# Patient Record
Sex: FEMALE | Race: WHITE | NOT HISPANIC OR LATINO | Employment: STUDENT | URBAN - METROPOLITAN AREA
[De-identification: names, ages, dates, MRNs, and addresses within clinical notes are randomized per-mention and may not be internally consistent; named-entity substitution may affect disease eponyms.]

---
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
## 2021-05-10 ENCOUNTER — IMMUNIZATIONS (OUTPATIENT)
Dept: FAMILY MEDICINE CLINIC | Facility: HOSPITAL | Age: 21
End: 2021-05-10

## 2021-05-10 DIAGNOSIS — Z23 ENCOUNTER FOR IMMUNIZATION: Primary | ICD-10-CM

## 2021-05-10 PROCEDURE — 91301 SARS-COV-2 / COVID-19 MRNA VACCINE (MODERNA) 100 MCG: CPT

## 2021-05-10 PROCEDURE — 0011A SARS-COV-2 / COVID-19 MRNA VACCINE (MODERNA) 100 MCG: CPT

## 2021-06-04 ENCOUNTER — OFFICE VISIT (OUTPATIENT)
Dept: FAMILY MEDICINE CLINIC | Facility: CLINIC | Age: 21
End: 2021-06-04
Payer: COMMERCIAL

## 2021-06-04 VITALS
HEIGHT: 64 IN | OXYGEN SATURATION: 99 % | HEART RATE: 77 BPM | SYSTOLIC BLOOD PRESSURE: 100 MMHG | TEMPERATURE: 98.2 F | DIASTOLIC BLOOD PRESSURE: 50 MMHG | BODY MASS INDEX: 27.96 KG/M2 | WEIGHT: 163.8 LBS

## 2021-06-04 DIAGNOSIS — Z11.1 SCREENING FOR TUBERCULOSIS: ICD-10-CM

## 2021-06-04 DIAGNOSIS — Z11.1 PPD SCREENING TEST: ICD-10-CM

## 2021-06-04 DIAGNOSIS — Z23 NEED FOR TDAP VACCINATION: ICD-10-CM

## 2021-06-04 DIAGNOSIS — Z01.84 IMMUNITY STATUS TESTING: ICD-10-CM

## 2021-06-04 DIAGNOSIS — Z00.00 ANNUAL PHYSICAL EXAM: Primary | ICD-10-CM

## 2021-06-04 PROCEDURE — 86580 TB INTRADERMAL TEST: CPT | Performed by: NURSE PRACTITIONER

## 2021-06-04 PROCEDURE — 1036F TOBACCO NON-USER: CPT | Performed by: NURSE PRACTITIONER

## 2021-06-04 PROCEDURE — 99395 PREV VISIT EST AGE 18-39: CPT | Performed by: NURSE PRACTITIONER

## 2021-06-04 PROCEDURE — 3725F SCREEN DEPRESSION PERFORMED: CPT | Performed by: NURSE PRACTITIONER

## 2021-06-04 PROCEDURE — 3008F BODY MASS INDEX DOCD: CPT | Performed by: NURSE PRACTITIONER

## 2021-06-04 RX ORDER — CLINDAMYCIN PHOSPHATE 10 UG/ML
LOTION TOPICAL
COMMUNITY
Start: 2020-12-16 | End: 2022-07-15 | Stop reason: ALTCHOICE

## 2021-06-04 RX ORDER — DOXYCYCLINE HYCLATE 100 MG
100 TABLET ORAL 2 TIMES DAILY WITH MEALS
COMMUNITY
Start: 2021-03-31 | End: 2022-07-15 | Stop reason: ALTCHOICE

## 2021-06-04 NOTE — PROGRESS NOTES
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  Orders placed  Will need Tdap  - schedule for >2 weeks  PPD placed today by MA  Read on Monday  Needs 2 step - 1 to 3 weeks after  Paperwork to be completed  In brown folder  recommend yearly physicals  Otherwise return as needed  Problem List Items Addressed This Visit     None      Visit Diagnoses     Annual physical exam    -  Primary    PPD screening test        Relevant Orders    TB Skin Test (Completed)    Need for Tdap vaccination        Screening for tuberculosis        Immunity status testing        Relevant Orders    Varicella zoster antibody, IgG    Mumps antibody, IgG    Rubella antibody, IgG    Rubeola antibody IgG    Hepatitis C Ab W/Refl To HCV RNA, Qn, PCR    Hepatitis B surface antibody          Immunizations and preventive care screenings were discussed with patient today  Appropriate education was printed on patient's after visit summary  Counseling:  Alcohol/drug use: discussed moderation in alcohol intake, the recommendations for healthy alcohol use, and avoidance of illicit drug use  Dental Health: discussed importance of regular tooth brushing, flossing, and dental visits  Injury prevention: discussed safety/seat belts, safety helmets, smoke detectors, carbon dioxide detectors, and smoking near bedding or upholstery    Sexual health: discussed sexually transmitted diseases, partner selection, use of condoms, avoidance of unintended pregnancy, and contraceptive alternatives  · Exercise: the importance of regular exercise/physical activity was discussed  Recommend exercise 3-5 times per week for at least 30 minutes  BMI Counseling: Body mass index is 28 31 kg/m²  The BMI is above normal  Nutrition recommendations include decreasing portion sizes, encouraging healthy choices of fruits and vegetables, decreasing fast food intake, moderation in carbohydrate intake, increasing intake of lean protein and reducing intake of saturated and trans fat  Exercise recommendations include moderate physical activity 150 minutes/week  Continue treadmill  Slowly increasing exercise  - pt with recent ACL tear/repair in December  Return in about 1 year (around 6/4/2022) for Annual physical      Chief Complaint:     Chief Complaint   Patient presents with   [address] [place] [address], nursing student, needs physical for school      History of Present Illness:     Adult Annual Physical   Patient here for a comprehensive physical exam  The patient reports no problems  Diet and Physical Activity  · Diet/Nutrition: well balanced diet and working on healthier diet  See BMI  · Exercise: walking  Tread mill  3-4 times weekly  Depression Screening  PHQ-9 Depression Screening    PHQ-9:   Frequency of the following problems over the past two weeks:      Little interest or pleasure in doing things: 0 - not at all  Feeling down, depressed, or hopeless: 0 - not at all  PHQ-2 Score: 0       General Health  · Sleep: sleeps well  · Hearing: normal - bilateral   · Vision: most recent eye exam >1 year ago and overdue  No formal emaination  in office snellen: 20/30: 20/30: 20/30  REcommend follow up with Ophthalmologist   For acuity check and routine general eye health      · Dental: regular dental visits         /GYN Health  · Last menstrual period: current  · Contraceptive method: nexplanon  Likely having removed  Random spotting  f/u with GYN  · History of STDs?: no  Denies need for STD testing today  Review of Systems:     Review of Systems   Constitutional: Negative  HENT: Negative  Respiratory: Negative  Cardiovascular: Negative  Gastrointestinal: Negative  Genitourinary: Negative  Musculoskeletal: Negative  Skin:        Acne - follows with Derm   Neurological: Negative  Psychiatric/Behavioral: Negative  Past Medical History:     History reviewed  No pertinent past medical history     Past Surgical History:     Past Surgical History:   Procedure Laterality Date    APPENDECTOMY      ARTHROSCOPIC REPAIR ACL        Social History:     E-Cigarette/Vaping    E-Cigarette Use Never User      E-Cigarette/Vaping Substances    Nicotine No     THC No     CBD No     Flavoring No     Other No     Unknown No      Social History     Socioeconomic History    Marital status: Single     Spouse name: None    Number of children: None    Years of education: None    Highest education level: None   Occupational History    None   Social Needs    Financial resource strain: None    Food insecurity     Worry: None     Inability: None    Transportation needs     Medical: None     Non-medical: None   Tobacco Use    Smoking status: Never Smoker    Smokeless tobacco: Never Used   Substance and Sexual Activity    Alcohol use: Never     Frequency: Never    Drug use: Never    Sexual activity: Not Currently   Lifestyle    Physical activity     Days per week: None     Minutes per session: None    Stress: None   Relationships    Social connections     Talks on phone: None     Gets together: None     Attends Temple service: None     Active member of club or organization: None     Attends meetings of clubs or organizations: None     Relationship status: None    Intimate partner violence     Fear of current or ex partner: None Emotionally abused: None     Physically abused: None     Forced sexual activity: None   Other Topics Concern    None   Social History Narrative    None      Family History:     Family History   Problem Relation Age of Onset    Hypertension Father     Breast cancer Maternal Grandmother     Prostate cancer Maternal Grandfather       Current Medications:     Current Outpatient Medications   Medication Sig Dispense Refill    benzoyl peroxide 5 % external liquid       clindamycin (CLEOCIN T) 1 % lotion       tretinoin (RETIN-A) 0 025 % cream       doxycycline hyclate (VIBRA-TABS) 100 mg tablet Take 100 mg by mouth 2 (two) times a day with meals      etonogestrel (Nexplanon) subdermal implant 68 mg by Subdermal route once       No current facility-administered medications for this visit  Allergies:     No Known Allergies   Physical Exam:     /50 (BP Location: Right arm, Patient Position: Sitting, Cuff Size: Adult)   Pulse 77   Temp 98 2 °F (36 8 °C)   Ht 5' 3 78" (1 62 m)   Wt 74 3 kg (163 lb 12 8 oz)   SpO2 99%   BMI 28 31 kg/m²     Physical Exam  Vitals signs and nursing note reviewed  Constitutional:       General: She is not in acute distress  Appearance: Normal appearance  She is well-developed, well-groomed and normal weight  She is not ill-appearing  HENT:      Head: Normocephalic and atraumatic  Right Ear: Hearing, tympanic membrane, ear canal and external ear normal       Left Ear: Hearing, tympanic membrane, ear canal and external ear normal       Nose: Nose normal       Mouth/Throat:      Mouth: Mucous membranes are moist       Pharynx: Oropharynx is clear  Tonsils: No tonsillar exudate  2+ on the right  2+ on the left  Eyes:      Extraocular Movements: Extraocular movements intact  Conjunctiva/sclera: Conjunctivae normal       Pupils: Pupils are equal, round, and reactive to light  Neck:      Thyroid: No thyromegaly  Vascular: No carotid bruit  Cardiovascular:      Rate and Rhythm: Normal rate and regular rhythm  No extrasystoles are present  Pulses:           Posterior tibial pulses are 2+ on the right side and 2+ on the left side  Heart sounds: Normal heart sounds  Pulmonary:      Effort: Pulmonary effort is normal  No respiratory distress  Breath sounds: Normal breath sounds and air entry  Abdominal:      General: Abdomen is flat  Bowel sounds are normal       Palpations: Abdomen is soft  Tenderness: There is no abdominal tenderness  Comments:  Umbilical piercing   Musculoskeletal:      Right lower leg: No edema  Left lower leg: No edema  Lymphadenopathy:      Cervical: No cervical adenopathy  Skin:     General: Skin is warm and dry  Neurological:      General: No focal deficit present  Mental Status: She is alert and oriented to person, place, and time  Cranial Nerves: No cranial nerve deficit  Sensory: No sensory deficit  Motor: No weakness  Coordination: Coordination normal       Gait: Gait normal    Psychiatric:         Attention and Perception: Attention normal          Mood and Affect: Mood and affect normal          Speech: Speech normal          Behavior: Behavior normal          Thought Content:  Thought content normal           [facility]   [facility]

## 2021-06-04 NOTE — PATIENT INSTRUCTIONS

## 2021-06-05 ENCOUNTER — IMMUNIZATIONS (OUTPATIENT)
Dept: FAMILY MEDICINE CLINIC | Facility: HOSPITAL | Age: 21
End: 2021-06-05

## 2021-06-05 DIAGNOSIS — Z23 ENCOUNTER FOR IMMUNIZATION: Primary | ICD-10-CM

## 2021-06-05 PROCEDURE — 91301 SARS-COV-2 / COVID-19 MRNA VACCINE (MODERNA) 100 MCG: CPT

## 2021-06-05 PROCEDURE — 0012A SARS-COV-2 / COVID-19 MRNA VACCINE (MODERNA) 100 MCG: CPT

## 2021-06-07 ENCOUNTER — APPOINTMENT (OUTPATIENT)
Dept: LAB | Facility: CLINIC | Age: 21
End: 2021-06-07

## 2021-06-07 ENCOUNTER — CLINICAL SUPPORT (OUTPATIENT)
Dept: FAMILY MEDICINE CLINIC | Facility: CLINIC | Age: 21
End: 2021-06-07

## 2021-06-07 DIAGNOSIS — Z11.1 PPD SCREENING TEST: Primary | ICD-10-CM

## 2021-06-07 DIAGNOSIS — Z01.84 IMMUNITY STATUS TESTING: Primary | ICD-10-CM

## 2021-06-07 LAB
INDURATION: 0 MM
RUBV IGG SERPL IA-ACNC: 100.1 IU/ML
TB SKIN TEST: NEGATIVE

## 2021-06-07 PROCEDURE — 36415 COLL VENOUS BLD VENIPUNCTURE: CPT

## 2021-06-07 PROCEDURE — 86762 RUBELLA ANTIBODY: CPT

## 2021-06-07 PROCEDURE — 86735 MUMPS ANTIBODY: CPT

## 2021-06-07 PROCEDURE — 86706 HEP B SURFACE ANTIBODY: CPT

## 2021-06-07 PROCEDURE — 86765 RUBEOLA ANTIBODY: CPT

## 2021-06-07 PROCEDURE — 86803 HEPATITIS C AB TEST: CPT

## 2021-06-07 PROCEDURE — 86787 VARICELLA-ZOSTER ANTIBODY: CPT

## 2021-06-08 LAB
HBV SURFACE AB SER-ACNC: 5.04 MIU/ML
HCV AB SER QL: NORMAL
MEV IGG SER QL: NORMAL
MUV IGG SER QL: NORMAL
VZV IGG SER IA-ACNC: NORMAL

## 2021-06-21 ENCOUNTER — CLINICAL SUPPORT (OUTPATIENT)
Dept: FAMILY MEDICINE CLINIC | Facility: CLINIC | Age: 21
End: 2021-06-21
Payer: COMMERCIAL

## 2021-06-21 DIAGNOSIS — Z11.1 SCREENING FOR TUBERCULOSIS: ICD-10-CM

## 2021-06-21 DIAGNOSIS — Z23 NEED FOR TETANUS, DIPHTHERIA, AND ACELLULAR PERTUSSIS (TDAP) VACCINE: Primary | ICD-10-CM

## 2021-06-21 PROCEDURE — 90715 TDAP VACCINE 7 YRS/> IM: CPT | Performed by: NURSE PRACTITIONER

## 2021-06-21 PROCEDURE — 86580 TB INTRADERMAL TEST: CPT | Performed by: NURSE PRACTITIONER

## 2021-06-21 PROCEDURE — 90471 IMMUNIZATION ADMIN: CPT | Performed by: NURSE PRACTITIONER

## 2021-06-23 ENCOUNTER — CLINICAL SUPPORT (OUTPATIENT)
Dept: FAMILY MEDICINE CLINIC | Facility: CLINIC | Age: 21
End: 2021-06-23

## 2021-06-23 DIAGNOSIS — Z11.1 SCREENING FOR TUBERCULOSIS: Primary | ICD-10-CM

## 2021-06-23 LAB
INDURATION: 0 MM
TB SKIN TEST: NEGATIVE

## 2021-07-13 ENCOUNTER — CLINICAL SUPPORT (OUTPATIENT)
Dept: FAMILY MEDICINE CLINIC | Facility: CLINIC | Age: 21
End: 2021-07-13
Payer: COMMERCIAL

## 2021-07-13 ENCOUNTER — TELEPHONE (OUTPATIENT)
Dept: FAMILY MEDICINE CLINIC | Facility: CLINIC | Age: 21
End: 2021-07-13

## 2021-07-13 VITALS — TEMPERATURE: 98.2 F

## 2021-07-13 DIAGNOSIS — Z23 NEED FOR HEPATITIS B VACCINATION: Primary | ICD-10-CM

## 2021-07-13 PROCEDURE — 90746 HEPB VACCINE 3 DOSE ADULT IM: CPT | Performed by: NURSE PRACTITIONER

## 2021-07-13 PROCEDURE — 90471 IMMUNIZATION ADMIN: CPT | Performed by: NURSE PRACTITIONER

## 2021-07-13 NOTE — TELEPHONE ENCOUNTER
----- Message from Gumaro Shaver DO sent at 7/13/2021  9:31 AM EDT -----  Regarding: heb B booster  Precious Ruth does not show immunity to Hepatitis B despite having 3 dose series in childhood  She will need to complete another 3 dose series per recommendation of her program   First dose given today, then 2nd dose in 4-8 weeks, 3rd dose in 6 months  If she still does not show immunity after her series is completed, I do not recommend she receive booster again  In that case she should be made aware that she is unable to mount immune response to Heb B and is at greater risk of infection if she exposed to hepatitis B

## 2021-07-13 NOTE — TELEPHONE ENCOUNTER
Left detailed message for mother to inform Idalia Collado of when to complete 2 and 3rd doses for Hep B  If she gets them done at school, to let us know for documentation purposes

## 2021-08-12 ENCOUNTER — CLINICAL SUPPORT (OUTPATIENT)
Dept: FAMILY MEDICINE CLINIC | Facility: CLINIC | Age: 21
End: 2021-08-12
Payer: COMMERCIAL

## 2021-08-12 VITALS — TEMPERATURE: 97.5 F

## 2021-08-12 DIAGNOSIS — Z23 NEED FOR HEPATITIS B VACCINATION: Primary | ICD-10-CM

## 2021-08-12 PROCEDURE — 90746 HEPB VACCINE 3 DOSE ADULT IM: CPT | Performed by: NURSE PRACTITIONER

## 2021-08-12 PROCEDURE — 90471 IMMUNIZATION ADMIN: CPT | Performed by: NURSE PRACTITIONER

## 2022-01-10 ENCOUNTER — CLINICAL SUPPORT (OUTPATIENT)
Dept: FAMILY MEDICINE CLINIC | Facility: CLINIC | Age: 22
End: 2022-01-10
Payer: COMMERCIAL

## 2022-01-10 DIAGNOSIS — Z23 NEED FOR VACCINATION: Primary | ICD-10-CM

## 2022-01-10 PROCEDURE — 90471 IMMUNIZATION ADMIN: CPT | Performed by: NURSE PRACTITIONER

## 2022-01-10 PROCEDURE — 90746 HEPB VACCINE 3 DOSE ADULT IM: CPT | Performed by: NURSE PRACTITIONER

## 2022-03-11 ENCOUNTER — OFFICE VISIT (OUTPATIENT)
Dept: FAMILY MEDICINE CLINIC | Facility: CLINIC | Age: 22
End: 2022-03-11
Payer: COMMERCIAL

## 2022-03-11 VITALS
BODY MASS INDEX: 23.56 KG/M2 | SYSTOLIC BLOOD PRESSURE: 100 MMHG | OXYGEN SATURATION: 99 % | DIASTOLIC BLOOD PRESSURE: 60 MMHG | RESPIRATION RATE: 16 BRPM | WEIGHT: 138 LBS | HEIGHT: 64 IN | HEART RATE: 97 BPM | TEMPERATURE: 97.7 F

## 2022-03-11 DIAGNOSIS — B27.90 ACUTE PHARYNGITIS DUE TO INFECTIOUS MONONUCLEOSIS: ICD-10-CM

## 2022-03-11 DIAGNOSIS — J03.90 TONSILLITIS: Primary | ICD-10-CM

## 2022-03-11 PROCEDURE — 1036F TOBACCO NON-USER: CPT | Performed by: FAMILY MEDICINE

## 2022-03-11 PROCEDURE — 99213 OFFICE O/P EST LOW 20 MIN: CPT | Performed by: FAMILY MEDICINE

## 2022-03-11 PROCEDURE — 3008F BODY MASS INDEX DOCD: CPT | Performed by: FAMILY MEDICINE

## 2022-03-11 RX ORDER — NORETHINDRONE ACETATE AND ETHINYL ESTRADIOL AND FERROUS FUMARATE 1MG-20(21)
1 KIT ORAL DAILY
COMMUNITY
Start: 2022-02-17

## 2022-03-11 RX ORDER — PREDNISONE 10 MG/1
TABLET ORAL
Qty: 30 TABLET | Refills: 1 | Status: SHIPPED | OUTPATIENT
Start: 2022-03-11 | End: 2022-05-25 | Stop reason: SDUPTHER

## 2022-03-13 NOTE — PROGRESS NOTES
[facility]      NAME: Aneesh Carias  AGE: 22 y o  SEX: female  : 2000   MRN: [id number]    DATE: 3/13/2022  TIME: 2:37 PM    Assessment and Plan     Problem List Items Addressed This Visit     None      Visit Diagnoses     Tonsillitis    -  Primary    Acute pharyngitis due to infectious mononucleosis        Relevant Medications    predniSONE 10 mg tablet              Return to office in:  P r n  Chief Complaint     Chief Complaint   Patient presents with    Sore Throat       History of Present Illness     Patient was seen for chief complaint of sore throat  Was seen at [facility] proximally 1 month ago at which time she was placed on antibiotics  Symptoms improved temporarily  Seen again at [facility] several days ago at which time rapid strep negative rapid mono test positive  The following portions of the patient's history were reviewed and updated as appropriate: allergies, current medications, past family history, past medical history, past social history, past surgical history and problem list     Review of Systems   Review of Systems   Constitutional: Negative  HENT: Positive for sore throat and trouble swallowing  Respiratory: Negative  Cardiovascular: Negative  Gastrointestinal: Negative  Genitourinary: Negative  Musculoskeletal: Negative  Psychiatric/Behavioral: Negative  Active Problem List   There is no problem list on file for this patient  Objective   /60 (BP Location: Left arm, Patient Position: Sitting, Cuff Size: Adult)   Pulse 97   Temp 97 7 °F (36 5 °C) (Temporal)   Resp 16   Ht 5' 3 78" (1 62 m)   Wt 62 6 kg (138 lb)   SpO2 99%   BMI 23 85 kg/m²     Physical Exam  Vitals and nursing note reviewed  Constitutional:       General: She is not in acute distress  Appearance: She is well-developed  She is not diaphoretic  HENT:      Head: Normocephalic and atraumatic        Mouth/Throat:      Pharynx: Pharyngeal swelling, oropharyngeal exudate and posterior oropharyngeal erythema present  Comments: Injected pharynx with enlarged tonsils and exudate  Eyes:      General:         Right eye: No discharge  Conjunctiva/sclera: Conjunctivae normal       Pupils: Pupils are equal, round, and reactive to light  Neck:      Thyroid: No thyromegaly  Cardiovascular:      Rate and Rhythm: Normal rate and regular rhythm  Pulmonary:      Effort: Pulmonary effort is normal  No respiratory distress  Breath sounds: Normal breath sounds  Musculoskeletal:      Cervical back: Normal range of motion  Lymphadenopathy:      Cervical: No cervical adenopathy  Skin:     General: Skin is warm and dry  Neurological:      Mental Status: She is alert and oriented to person, place, and time  Psychiatric:         Behavior: Behavior normal          Thought Content:  Thought content normal          Judgment: Judgment normal            Current Medications     Current Outpatient Medications:     Junel FE 1/20 1-20 MG-MCG per tablet, Take 1 tablet by mouth daily, Disp: , Rfl:     benzoyl peroxide 5 % external liquid, , Disp: , Rfl:     clindamycin (CLEOCIN T) 1 % lotion, , Disp: , Rfl:     doxycycline hyclate (VIBRA-TABS) 100 mg tablet, Take 100 mg by mouth 2 (two) times a day with meals (Patient not taking: Reported on 3/11/2022 ), Disp: , Rfl:     etonogestrel (Nexplanon) subdermal implant, 68 mg by Subdermal route once (Patient not taking: Reported on 3/11/2022 ), Disp: , Rfl:     predniSONE 10 mg tablet, Five p o  for 2 days Four p o  for 2 Three p o  for 2 days Two p o  for 2 days One p o  for 2 days, Disp: 30 tablet, Rfl: 1    tretinoin (RETIN-A) 0 025 % cream, , Disp: , Rfl:     Health Maintenance     Health Maintenance   Topic Date Due    HIV Screening  Never done    Chlamydia Screening  Never done    Influenza Vaccine (1) 09/01/2021    Depression Screening  06/04/2022    Annual Physical  06/04/2022    BMI: Adult 03/11/2023    DTaP,Tdap,and Td Vaccines (7 - Td or Tdap) 06/21/2031    Hepatitis C Screening  Completed    HIB Vaccine  Completed    Hepatitis B Vaccine  Completed    IPV Vaccine  Completed    Hepatitis A Vaccine  Completed    Meningococcal ACWY Vaccine  Completed    HPV Vaccine  Completed    COVID-19 Vaccine  Completed    Pneumococcal Vaccine: Pediatrics (0 to 5 Years) and At-Risk Patients (6 to 59 Years)  Aged [employer] History   Administered Date(s) Administered    COVID-19 MODERNA VACC 0 5 ML IM 05/10/2021, 06/05/2021, 12/07/2021    DTaP,unspecified 02/14/2002, 04/25/2002, 06/27/2002, 01/14/2004, 05/15/2007    HPV 01/09/2014, 03/13/2014, 07/16/2014    Hep A, ped/adol, 2 dose 08/22/2012, 01/09/2014    Hep B, Adolescent or Pediatric 2001, 02/14/2002, 09/16/2002    Hep B, adult 07/13/2021, 08/12/2021, 01/10/2022    HiB 02/14/2002, 04/25/2002, 06/27/2002, 04/01/2003    INFLUENZA 10/24/2002, 11/26/2002, 10/04/2004, 04/11/2007, 08/13/2009, 08/20/2010, 02/03/2012, 08/22/2012, 01/09/2014, 02/13/2015, 09/18/2015, 03/03/2017, 12/22/2017, 01/07/2019    IPV 02/14/2002, 04/25/2002, 01/14/2004, 05/15/2007    MMR 12/17/2002, 04/20/2006    Meningococcal B, OMV (BEXSERO) 04/02/2019, 05/23/2019    Meningococcal Conjugate (MCV4O) 02/13/2015, 12/22/2017    Pneumococcal Conjugate PCV 7 04/25/2002, 06/27/2002, 11/26/2002, 09/03/2004    Td (adult), Unspecified 02/13/2015    Tdap 06/21/2021    Tuberculin Skin Test-PPD Intradermal 06/06/2019, 06/16/2019, 06/04/2021, 06/21/2021    Varicella 12/17/2002, 05/15/2007       Ani Mathias DO  [facility]

## 2022-05-25 ENCOUNTER — APPOINTMENT (OUTPATIENT)
Dept: RADIOLOGY | Facility: CLINIC | Age: 22
End: 2022-05-25
Payer: COMMERCIAL

## 2022-05-25 ENCOUNTER — OFFICE VISIT (OUTPATIENT)
Dept: FAMILY MEDICINE CLINIC | Facility: CLINIC | Age: 22
End: 2022-05-25
Payer: COMMERCIAL

## 2022-05-25 VITALS
BODY MASS INDEX: 24.99 KG/M2 | OXYGEN SATURATION: 99 % | WEIGHT: 150 LBS | HEART RATE: 81 BPM | DIASTOLIC BLOOD PRESSURE: 82 MMHG | HEIGHT: 65 IN | SYSTOLIC BLOOD PRESSURE: 110 MMHG | TEMPERATURE: 98.4 F

## 2022-05-25 DIAGNOSIS — J98.8 RESPIRATORY INFECTION: Primary | ICD-10-CM

## 2022-05-25 DIAGNOSIS — J98.8 RESPIRATORY INFECTION: ICD-10-CM

## 2022-05-25 PROCEDURE — 3008F BODY MASS INDEX DOCD: CPT | Performed by: NURSE PRACTITIONER

## 2022-05-25 PROCEDURE — 1036F TOBACCO NON-USER: CPT | Performed by: NURSE PRACTITIONER

## 2022-05-25 PROCEDURE — 99213 OFFICE O/P EST LOW 20 MIN: CPT | Performed by: NURSE PRACTITIONER

## 2022-05-25 PROCEDURE — 3725F SCREEN DEPRESSION PERFORMED: CPT | Performed by: NURSE PRACTITIONER

## 2022-05-25 PROCEDURE — 71046 X-RAY EXAM CHEST 2 VIEWS: CPT

## 2022-05-25 RX ORDER — PREDNISONE 10 MG/1
TABLET ORAL
Qty: 30 TABLET | Refills: 0 | Status: SHIPPED | OUTPATIENT
Start: 2022-05-25 | End: 2022-07-15 | Stop reason: ALTCHOICE

## 2022-05-25 RX ORDER — AZITHROMYCIN 250 MG/1
TABLET, FILM COATED ORAL
Qty: 6 TABLET | Refills: 0 | Status: SHIPPED | OUTPATIENT
Start: 2022-05-25 | End: 2022-05-30

## 2022-05-25 RX ORDER — ALBUTEROL SULFATE 90 UG/1
2 AEROSOL, METERED RESPIRATORY (INHALATION) EVERY 6 HOURS PRN
Qty: 6.7 G | Refills: 0 | Status: SHIPPED | OUTPATIENT
Start: 2022-05-25 | End: 2022-06-21

## 2022-05-25 NOTE — PROGRESS NOTES
[facility]ICAL [facility]    ASSESSMENT AND PLAN     1  Respiratory infection  Assessment & Plan:  Treat today with Z-jp and prednisone taper  Rx for Albuterol - educated on proper use  May continue OTC Mucinex  Tylenol/advil prn  Sinus rinses, warm salt water gargles  Symptoms >3 weeks - will check chest xray today - very wheezing with no asthmatic history  Advised result will flow to [facility]  We will call if alternate plan is indicated  Advised to f/u in office if symptoms persist, change or worsen  Pt agreeable with plan    Orders:  -     azithromycin (Zithromax) 250 mg tablet; Take 2 tablets (500 mg total) by mouth daily for 1 day, THEN 1 tablet (250 mg total) daily for 4 days  -     predniSONE 10 mg tablet; Five p o  for 2 days Four p o  for 2 Three p o  for 2 days Two p o  for 2 days One p o  for 2 days  -     albuterol (Proventil HFA) 90 mcg/act inhaler; Inhale 2 puffs every 6 (six) hours as needed for wheezing or shortness of breath  -     XR chest pa & lateral; Future; Expected date: 05/25/2022         SUBJECTIVE       Patient ID: Sarah Urias is a 22 y o  female  Chief Complaint   Patient presents with    Cold Like Symptoms     Cough, congestion, exepelling yellow mucus- x3 weeks       HISTORY OF PRESENT ILLNESS    Presents with respiratory symptoms  Started 3 weeks ago just 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  Note she was also recently treated for Mono in March  Symptoms today: sinus pressure, nasal and chest congestion, productive cough - yellow mucus  No fever  No GI  OTC Mucinex spray  Home Covid (-)  She is vax and boosted    Cough  This is a new problem  The current episode started 1 to 4 weeks ago  The problem has been unchanged  The problem occurs every few minutes  The cough is productive of sputum  Associated symptoms include chest pain, headaches, nasal congestion, postnasal drip, rhinorrhea and wheezing   Pertinent negatives include no chills, ear congestion, ear pain, fever, heartburn, hemoptysis, myalgias, rash, sore throat, shortness of breath, sweats or weight loss  The symptoms are aggravated by exercise  Risk factors for lung disease include smoking/tobacco exposure  The following portions of the patient's history were reviewed and updated as appropriate: allergies, current medications, past family history, past medical history, past social history, past surgical history, and problem list     REVIEW OF SYSTEMS  Review of Systems   Constitutional: Positive for fatigue  Negative for activity change, appetite change, chills, fever and weight loss  HENT: Positive for congestion, postnasal drip and rhinorrhea  Negative for ear pain and sore throat  Respiratory: Positive for cough, chest tightness and wheezing  Negative for hemoptysis and shortness of breath  Cardiovascular: Positive for chest pain  Gastrointestinal: Negative  Negative for heartburn  Musculoskeletal: Negative for myalgias  Skin: Negative for rash  Neurological: Positive for headaches  Psychiatric/Behavioral: Negative  OBJECTIVE      VITAL SIGNS  /82 (BP Location: Left arm, Patient Position: Sitting, Cuff Size: Standard)   Pulse 81   Temp 98 4 °F (36 9 °C) (Temporal)   Ht 5' 5" (1 651 m)   Wt 68 kg (150 lb)   LMP 05/25/2022   SpO2 99%   BMI 24 96 kg/m²     CURRENT MEDICATIONS    Current Outpatient Medications:     albuterol (Proventil HFA) 90 mcg/act inhaler, Inhale 2 puffs every 6 (six) hours as needed for wheezing or shortness of breath, Disp: 6 7 g, Rfl: 0    azithromycin (Zithromax) 250 mg tablet, Take 2 tablets (500 mg total) by mouth daily for 1 day, THEN 1 tablet (250 mg total) daily for 4 days  , Disp: 6 tablet, Rfl: 0    Junel FE 1/20 1-20 MG-MCG per tablet, Take 1 tablet by mouth daily, Disp: , Rfl:     predniSONE 10 mg tablet, Five p o  for 2 days Four p o  for 2 Three p o  for 2 days Two p o  for 2 days One p o  for 2 days, Disp: 30 tablet, Rfl: 0    tretinoin (RETIN-A) 0 025 % cream, , Disp: , Rfl:     benzoyl peroxide 5 % external liquid, , Disp: , Rfl:     clindamycin (CLEOCIN T) 1 % lotion, , Disp: , Rfl:     doxycycline hyclate (VIBRA-TABS) 100 mg tablet, Take 100 mg by mouth 2 (two) times a day with meals (Patient not taking: No sig reported), Disp: , Rfl:     etonogestrel (NEXPLANON) subdermal implant, 68 mg by Subdermal route once (Patient not taking: No sig reported), Disp: , Rfl:       PHYSICAL EXAMINATION   Physical Exam  Vitals and nursing note reviewed  Constitutional:       General: She is not in acute distress  Appearance: Normal appearance  She is well-developed and well-groomed  She is not ill-appearing  HENT:      Head: Normocephalic and atraumatic  Right Ear: Tympanic membrane, ear canal and external ear normal       Left Ear: Tympanic membrane, ear canal and external ear normal       Nose: Mucosal edema, congestion and rhinorrhea present  Mouth/Throat:      Mouth: Mucous membranes are moist       Pharynx: No posterior oropharyngeal erythema  Tonsils: No tonsillar exudate  3+ on the right  3+ on the left  Eyes:      Pupils: Pupils are equal, round, and reactive to light  Neck:      Vascular: No carotid bruit  Cardiovascular:      Rate and Rhythm: Normal rate and regular rhythm  Heart sounds: Normal heart sounds  Pulmonary:      Effort: Pulmonary effort is normal  No respiratory distress  Breath sounds: Normal air entry  Wheezing (through out) present  No rhonchi or rales  Lymphadenopathy:      Cervical: No cervical adenopathy  Skin:     General: Skin is warm and dry  Neurological:      General: No focal deficit present  Mental Status: She is alert and oriented to person, place, and time  Psychiatric:         Attention and Perception: Attention normal          Mood and Affect: Mood normal          Speech: Speech normal          Behavior: Behavior normal          Thought Content:  Thought content normal          Cognition and Memory: Cognition normal          Judgment: Judgment normal

## 2022-05-25 NOTE — ASSESSMENT & PLAN NOTE
Treat today with Z-jp and prednisone taper  Rx for Albuterol - educated on proper use  May continue OTC Mucinex  Tylenol/advil prn  Sinus rinses, warm salt water gargles  Symptoms >3 weeks - will check chest xray today - very wheezing with no asthmatic history  Advised result will flow to [facility]  We will call if alternate plan is indicated  Advised to f/u in office if symptoms persist, change or worsen   Pt agreeable with plan

## 2022-06-13 ENCOUNTER — OFFICE VISIT (OUTPATIENT)
Dept: FAMILY MEDICINE CLINIC | Facility: CLINIC | Age: 22
End: 2022-06-13
Payer: COMMERCIAL

## 2022-06-13 VITALS
HEART RATE: 92 BPM | OXYGEN SATURATION: 98 % | BODY MASS INDEX: 25.06 KG/M2 | HEIGHT: 65 IN | WEIGHT: 150.4 LBS | SYSTOLIC BLOOD PRESSURE: 116 MMHG | TEMPERATURE: 98.9 F | DIASTOLIC BLOOD PRESSURE: 70 MMHG

## 2022-06-13 DIAGNOSIS — Z13.1 SCREENING FOR DIABETES MELLITUS: ICD-10-CM

## 2022-06-13 DIAGNOSIS — R00.2 PALPITATIONS: ICD-10-CM

## 2022-06-13 DIAGNOSIS — Z13.0 SCREENING, ANEMIA, DEFICIENCY, IRON: ICD-10-CM

## 2022-06-13 DIAGNOSIS — Z13.29 SCREENING FOR THYROID DISORDER: ICD-10-CM

## 2022-06-13 DIAGNOSIS — Z13.220 SCREENING, LIPID: ICD-10-CM

## 2022-06-13 DIAGNOSIS — Z00.00 ANNUAL PHYSICAL EXAM: Primary | ICD-10-CM

## 2022-06-13 DIAGNOSIS — Z11.1 SCREENING FOR TUBERCULOSIS: ICD-10-CM

## 2022-06-13 DIAGNOSIS — Z11.4 SCREENING FOR HIV (HUMAN IMMUNODEFICIENCY VIRUS): ICD-10-CM

## 2022-06-13 DIAGNOSIS — Z13.21 ENCOUNTER FOR VITAMIN DEFICIENCY SCREENING: ICD-10-CM

## 2022-06-13 DIAGNOSIS — R42 DIZZINESS: ICD-10-CM

## 2022-06-13 DIAGNOSIS — Z11.59 NEED FOR HEPATITIS C SCREENING TEST: ICD-10-CM

## 2022-06-13 DIAGNOSIS — Z11.3 SCREENING FOR STD (SEXUALLY TRANSMITTED DISEASE): ICD-10-CM

## 2022-06-13 PROCEDURE — 3008F BODY MASS INDEX DOCD: CPT | Performed by: NURSE PRACTITIONER

## 2022-06-13 PROCEDURE — 86580 TB INTRADERMAL TEST: CPT | Performed by: NURSE PRACTITIONER

## 2022-06-13 PROCEDURE — 1036F TOBACCO NON-USER: CPT | Performed by: NURSE PRACTITIONER

## 2022-06-13 PROCEDURE — 99395 PREV VISIT EST AGE 18-39: CPT | Performed by: NURSE PRACTITIONER

## 2022-06-13 NOTE — PROGRESS NOTES
ADULT ANNUAL [address] [place] [address] MEDICAL GROUP    NAME: Lucy Duarte  AGE: 22 y o  SEX: female  : 2000     DATE: 2022     Assessment and Plan:   Comprehensive physical exam    Discussed palpitations  Seem to occur most at HS when laying down to sleep  Discussed differentials  Anxiety, sleep depravation, Gerd  Will get lab work as below  Advised she keep activity/diet diary of when symptoms occur  Discussed possible Holter  She will f/u in next 2 -3 weeks  Sooner with concerns  PMH reviewed  Preventative care and recommended screenings reviewed/ordered    Problem List Items Addressed This Visit        Other    Palpitations     Discussed recent palpitations/dizziness             Relevant Orders    CBC and differential    Comprehensive metabolic panel    TSH, 3rd generation with Free T4 reflex    Iron, TIBC and Ferritin Panel      Other Visit Diagnoses     Annual physical exam    -  Primary    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 [employer]    Screening for diabetes mellitus        Relevant Orders    Comprehensive metabolic panel    Encounter for vitamin deficiency screening        Relevant Orders    Vitamin D 25 hydroxy    Vitamin B12    Screening, lipid        Screening for thyroid disorder        Relevant Orders    TSH, 3rd generation with Free T4 reflex    Dizziness        Relevant Orders    Comprehensive metabolic panel    TSH, 3rd generation with Free T4 reflex    Iron, TIBC and Ferritin Panel    Screening, anemia, deficiency, iron        Relevant Orders    CBC and differential    Iron, TIBC and Ferritin Panel    Screening for STD (sexually transmitted disease)        Relevant Orders    Chlamydia/GC amplified DNA by PCR    RPR    Herpes I/II IgG PAWAN w Reflex to HSV-2    Human Immunodeficiency Virus 1/2 Antigen / Antibody ( Fourth Generation) with Reflex Testing    Hepatitis C Ab W/Refl To HCV RNA, Qn, PCR    Screening for HIV (human immunodeficiency virus)        Relevant Orders    Human Immunodeficiency Virus 1/2 Antigen / Antibody ( Fourth Generation) with Reflex Testing    Need for hepatitis C screening test        Relevant Orders    Hepatitis C Ab W/Refl To HCV RNA, Qn, PCR    Screening for tuberculosis        Required for clinical at nursing school  Administered today by LPN  Patient advised to follow-up Wednesday afternoon/Thursday morning for read    Relevant Orders    TB Skin Test (Completed)          Immunizations and preventive care screenings were discussed with patient today  Appropriate education was printed on patient's after visit summary  Counseling:  Alcohol/drug use: discussed moderation in alcohol intake, the recommendations for healthy alcohol use, and avoidance of illicit drug use  Dental Health: discussed importance of regular tooth brushing, flossing, and dental visits  Injury prevention: discussed safety/seat belts, safety helmets, smoke detectors, carbon dioxide detectors, and smoking near bedding or upholstery  Sexual health: discussed sexually transmitted diseases, partner selection, use of condoms, avoidance of unintended pregnancy, and contraceptive alternatives  · Exercise: the importance of regular exercise/physical activity was discussed  Recommend exercise 3-5 times per week for at least 30 minutes  BMI Counseling: Body mass index is 24 8 kg/m²  The BMI is above normal  Nutrition recommendations include increasing intake of lean protein and reducing intake of saturated and trans fat  Exercise recommendations include moderate physical activity 150 minutes/week  Rationale for BMI follow-up plan is due to patient being overweight or obese  Healthy lifestsyle discussed    Depression Screening and Follow-up Plan: Clincally patient does not have depression  No treatment is required  Does appear to have some periodic anxiety           Return in about 4 weeks (around 7/11/2022) for Recheck w/ me  Chief Complaint:     Chief Complaint   Patient presents with    Physical Exam     Nursing school      History of Present Illness:     Adult Annual Physical   Patient here for a comprehensive physical exam  The patient reports problems - heart palpitations - random  Cannot identify triggers  Occur randomly, but most when laying down  No chest pain       Diet and Physical Activity  · Diet/Nutrition: well balanced diet  · Exercise: moderate cardiovascular exercise  Depression Screening  PHQ-2/9 Depression Screening    Little interest or pleasure in doing things: 0 - not at all  Feeling down, depressed, or hopeless: 0 - not at all       [facility]  · Sleep: sleeps well  · Hearing: normal - bilateral   · Vision: no vision problems, most recent eye exam >1 year ago and Snellen: 20/40  Dsicussed importance of regular eye/vision check ups  · Dental: regular dental visits  /GYN Health  · Last menstrual period: 5/25  · Contraceptive method: oral contraceptives  · History of STDs?: no  Testing offered today - Not currently sexually active, but pt would like to complete  Aware of importance of condom use  · Will be due for first Women's health exam 2023     Review of Systems:     Review of Systems   Constitutional: Negative  HENT: Negative  Eyes: Negative  Respiratory: Negative  Cardiovascular: Positive for palpitations (most notable when laying down at night)  Negative for chest pain and leg swelling  Gastrointestinal: Negative  Genitourinary: Negative  Musculoskeletal: Negative  Skin: Negative  Neurological: Positive for dizziness (occasional episodes - cannot pinpoint when they occur)  Psychiatric/Behavioral: Negative  Past Medical History:     History reviewed  No pertinent past medical history     Past Surgical History:     Past Surgical History:   Procedure Laterality Date    APPENDECTOMY      ARTHROSCOPIC REPAIR ACL        Social History:     Social History     Socioeconomic History    Marital status: Single     Spouse name: None    Number of children: None    Years of education: None    Highest education level: None   Occupational History    None   Tobacco Use    Smoking status: Never Smoker    Smokeless tobacco: Never Used   Vaping Use    Vaping Use: Some days    Substances: Nicotine, Flavoring   Substance and Sexual Activity    Alcohol use: Yes     Comment: socially    Drug use: Never    Sexual activity: Not Currently   Other Topics Concern    None   Social History Narrative    None     Social Determinants of Health     Financial Resource Strain: Not on file   Food Insecurity: Not on file   Transportation Needs: Not on file   Physical Activity: Not on file   Stress: Not on file   Social Connections: Not on file   Intimate Partner Violence: Not on file   Housing Stability: Not on file      Family History:     Family History   Problem Relation Age of Onset    Hypertension Father     Breast cancer Maternal Grandmother     Prostate cancer Maternal Grandfather       Current Medications:     Current Outpatient Medications   Medication Sig Dispense Refill    albuterol (Proventil HFA) 90 mcg/act inhaler Inhale 2 puffs every 6 (six) hours as needed for wheezing or shortness of breath 6 7 g 0    Junel FE 1/20 1-20 MG-MCG per tablet Take 1 tablet by mouth daily      tretinoin (RETIN-A) 0 025 % cream       benzoyl peroxide 5 % external liquid  (Patient not taking: No sig reported)      clindamycin (CLEOCIN T) 1 % lotion  (Patient not taking: No sig reported)      doxycycline hyclate (VIBRA-TABS) 100 mg tablet Take 100 mg by mouth 2 (two) times a day with meals (Patient not taking: No sig reported)      predniSONE 10 mg tablet Five p o  for 2 days Four p o  for 2 Three p o  for 2 days Two p o  for 2 days One p o  for 2 days (Patient not taking: Reported on 6/13/2022) 30 tablet 0     No current facility-administered medications for this visit  Allergies:     No Known Allergies   Physical Exam:     /70 (BP Location: Left arm, Patient Position: Sitting, Cuff Size: Standard)   Pulse 92   Temp 98 9 °F (37 2 °C) (Temporal)   Ht 5' 5 3" (1 659 m)   Wt 68 2 kg (150 lb 6 4 oz)   LMP 05/25/2022 (Exact Date)   SpO2 98%   Breastfeeding No   BMI 24 80 kg/m²     Physical Exam  Vitals and nursing note reviewed  Constitutional:       General: She is not in acute distress  Appearance: Normal appearance  She is well-developed and well-groomed  She is not ill-appearing  HENT:      Head: Normocephalic and atraumatic  Right Ear: Tympanic membrane, ear canal and external ear normal       Left Ear: Tympanic membrane, ear canal and external ear normal       Nose: Nose normal       Mouth/Throat:      Mouth: Mucous membranes are moist    Eyes:      Extraocular Movements: Extraocular movements intact  Conjunctiva/sclera: Conjunctivae normal       Pupils: Pupils are equal, round, and reactive to light  Neck:      Thyroid: No thyromegaly  Vascular: No carotid bruit  Cardiovascular:      Rate and Rhythm: Normal rate and regular rhythm  Pulses:           Posterior tibial pulses are 2+ on the right side and 2+ on the left side  Heart sounds: Normal heart sounds  Pulmonary:      Effort: Pulmonary effort is normal  No respiratory distress  Breath sounds: Normal breath sounds and air entry  Musculoskeletal:      Cervical back: Full passive range of motion without pain  Right lower leg: No edema  Left lower leg: No edema  Lymphadenopathy:      Cervical: No cervical adenopathy  Skin:     General: Skin is warm and dry  Neurological:      General: No focal deficit present  Mental Status: She is alert and oriented to person, place, and time  Cranial Nerves: Cranial nerves are intact  Sensory: Sensation is intact  Motor: Motor function is intact        Coordination: Coordination is intact  Gait: Gait is intact  Psychiatric:         Attention and Perception: Attention normal          Mood and Affect: Mood normal          Speech: Speech normal          Behavior: Behavior normal          Thought Content:  Thought content normal          Cognition and Memory: Cognition normal          Judgment: Judgment normal           [facility]    [address] [facility] [address]

## 2022-06-13 NOTE — PATIENT INSTRUCTIONS

## 2022-06-15 LAB
25(OH)D3 SERPL-MCNC: 34 NG/ML (ref 30–100)
ALBUMIN SERPL-MCNC: 4.2 G/DL (ref 3.6–5.1)
ALBUMIN/GLOB SERPL: 1.4 (CALC) (ref 1–2.5)
ALP SERPL-CCNC: 40 U/L (ref 31–125)
ALT SERPL-CCNC: 16 U/L (ref 6–29)
AST SERPL-CCNC: 19 U/L (ref 10–30)
BASOPHILS # BLD AUTO: 28 CELLS/UL (ref 0–200)
BASOPHILS NFR BLD AUTO: 0.5 %
BILIRUB SERPL-MCNC: 0.6 MG/DL (ref 0.2–1.2)
BUN SERPL-MCNC: 11 MG/DL (ref 7–25)
BUN/CREAT SERPL: NORMAL (CALC) (ref 6–22)
C TRACH RRNA SPEC QL NAA+PROBE: NOT DETECTED
CALCIUM SERPL-MCNC: 9.3 MG/DL (ref 8.6–10.2)
CHLORIDE SERPL-SCNC: 105 MMOL/L (ref 98–110)
CO2 SERPL-SCNC: 24 MMOL/L (ref 20–32)
CREAT SERPL-MCNC: 0.73 MG/DL (ref 0.5–1.1)
EOSINOPHIL # BLD AUTO: 72 CELLS/UL (ref 15–500)
EOSINOPHIL NFR BLD AUTO: 1.3 %
ERYTHROCYTE [DISTWIDTH] IN BLOOD BY AUTOMATED COUNT: 13.4 % (ref 11–15)
FERRITIN SERPL-MCNC: 21 NG/ML (ref 16–154)
GLOBULIN SER CALC-MCNC: 3.1 G/DL (CALC) (ref 1.9–3.7)
GLUCOSE SERPL-MCNC: 76 MG/DL (ref 65–99)
HCT VFR BLD AUTO: 42.5 % (ref 35–45)
HCV AB S/CO SERPL IA: 0.07
HCV AB SERPL QL IA: NORMAL
HGB BLD-MCNC: 14.5 G/DL (ref 11.7–15.5)
HIV 1+2 AB+HIV1 P24 AG SERPL QL IA: NORMAL
HSV1 IGG SER IA-ACNC: <0.9 INDEX
HSV2 IGG SER IA-ACNC: <0.9 INDEX
IRON SATN MFR SERPL: 33 % (CALC) (ref 16–45)
IRON SERPL-MCNC: 148 MCG/DL (ref 40–190)
LYMPHOCYTES # BLD AUTO: 1980 CELLS/UL (ref 850–3900)
LYMPHOCYTES NFR BLD AUTO: 36 %
MCH RBC QN AUTO: 29.4 PG (ref 27–33)
MCHC RBC AUTO-ENTMCNC: 34.1 G/DL (ref 32–36)
MCV RBC AUTO: 86.2 FL (ref 80–100)
MONOCYTES # BLD AUTO: 402 CELLS/UL (ref 200–950)
MONOCYTES NFR BLD AUTO: 7.3 %
N GONORRHOEA RRNA SPEC QL NAA+PROBE: NOT DETECTED
NEUTROPHILS # BLD AUTO: 3020 CELLS/UL (ref 1500–7800)
NEUTROPHILS NFR BLD AUTO: 54.9 %
PLATELET # BLD AUTO: 200 THOUSAND/UL (ref 140–400)
PMV BLD REES-ECKER: 11.8 FL (ref 7.5–12.5)
POTASSIUM SERPL-SCNC: 4.1 MMOL/L (ref 3.5–5.3)
PROT SERPL-MCNC: 7.3 G/DL (ref 6.1–8.1)
RBC # BLD AUTO: 4.93 MILLION/UL (ref 3.8–5.1)
RPR SER QL: NORMAL
SL AMB EGFR AFRICAN AMERICAN: 137 ML/MIN/1.73M2
SL AMB EGFR NON AFRICAN AMERICAN: 119 ML/MIN/1.73M2
SODIUM SERPL-SCNC: 137 MMOL/L (ref 135–146)
TIBC SERPL-MCNC: 447 MCG/DL (CALC) (ref 250–450)
TSH SERPL-ACNC: 1.01 MIU/L
VIT B12 SERPL-MCNC: 294 PG/ML (ref 200–1100)
WBC # BLD AUTO: 5.5 THOUSAND/UL (ref 3.8–10.8)

## 2022-06-16 ENCOUNTER — CLINICAL SUPPORT (OUTPATIENT)
Dept: FAMILY MEDICINE CLINIC | Facility: CLINIC | Age: 22
End: 2022-06-16

## 2022-06-16 DIAGNOSIS — Z23 ENCOUNTER FOR IMMUNIZATION: Primary | ICD-10-CM

## 2022-06-16 LAB
INDURATION: 0 MM
TB SKIN TEST: NEGATIVE

## 2022-06-21 DIAGNOSIS — J98.8 RESPIRATORY INFECTION: ICD-10-CM

## 2022-06-21 RX ORDER — ALBUTEROL SULFATE 90 UG/1
AEROSOL, METERED RESPIRATORY (INHALATION)
Qty: 6.7 G | Refills: 0 | Status: SHIPPED | OUTPATIENT
Start: 2022-06-21

## 2022-07-01 ENCOUNTER — TELEPHONE (OUTPATIENT)
Dept: FAMILY MEDICINE CLINIC | Facility: CLINIC | Age: 22
End: 2022-07-01

## 2022-07-01 NOTE — TELEPHONE ENCOUNTER
Jessica, on 6/13/22 you ordered a Vit D test for patient and used a screening code  Insurance will not cover  I am putting a list on your desk  As this is for [facility] you will only need to add the diagnosis to her problem list  Please let me know   Thanks, Neena

## 2022-07-15 ENCOUNTER — OFFICE VISIT (OUTPATIENT)
Dept: FAMILY MEDICINE CLINIC | Facility: CLINIC | Age: 22
End: 2022-07-15
Payer: COMMERCIAL

## 2022-07-15 VITALS
BODY MASS INDEX: 25.33 KG/M2 | SYSTOLIC BLOOD PRESSURE: 92 MMHG | HEIGHT: 65 IN | WEIGHT: 152 LBS | OXYGEN SATURATION: 99 % | HEART RATE: 74 BPM | DIASTOLIC BLOOD PRESSURE: 60 MMHG | TEMPERATURE: 98.3 F

## 2022-07-15 DIAGNOSIS — F41.9 ANXIETY: Primary | ICD-10-CM

## 2022-07-15 DIAGNOSIS — R00.2 PALPITATIONS: ICD-10-CM

## 2022-07-15 DIAGNOSIS — F41.0 PANIC ATTACKS: ICD-10-CM

## 2022-07-15 PROCEDURE — 93000 ELECTROCARDIOGRAM COMPLETE: CPT | Performed by: NURSE PRACTITIONER

## 2022-07-15 PROCEDURE — 99214 OFFICE O/P EST MOD 30 MIN: CPT | Performed by: NURSE PRACTITIONER

## 2022-07-15 RX ORDER — HYDROXYZINE HYDROCHLORIDE 25 MG/1
25 TABLET, FILM COATED ORAL EVERY 6 HOURS PRN
Qty: 30 TABLET | Refills: 1 | Status: SHIPPED | OUTPATIENT
Start: 2022-07-15

## 2022-07-15 NOTE — PROGRESS NOTES
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  Will start sertraline daily  P r n  Hydroxyzine  Dose/use/potential side effects reviewed for both  Will also check Holter monitor today, just to rule out any possible arrhythmia  Reinforce importance of healthy lifestyle:  Diet, exercise, rest-adequate sleep  Reassess symptoms and medication effectiveness in the next 3-4 weeks, prior to returning to [facility]  Follow up sooner with problems or concerns    Orders:  -     sertraline (Zoloft) 50 mg tablet; Take 1 tablet (50 mg total) by mouth daily Take 1/2 tab (25mg) first 8 days  Increase to 50 if tolerating  2  Panic attacks  -     hydrOXYzine HCL (ATARAX) 25 mg tablet; Take 1 tablet (25 mg total) by mouth every 6 (six) hours as needed for itching    3  Palpitations  -     POCT ECG  -     Holter monitor; Future; Expected date: 07/15/2022         SUBJECTIVE       Patient ID: Gideon Zhou is a 22 y o  female  Chief Complaint   Patient presents with    Follow-up       HISTORY OF PRESENT ILLNESS    Presents for recheck  Continues with feelings of palpitations  Gets shaky and dizzy  No specific times - occur random  Does not have diary of when exactly symptoms occur  Symptoms last anywhere from 30 minutes to hours  Recent lab work completed - unremarkable  No new stressors, but 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[address] [employer]  No history of anxiety  Cannot recall ever having panic attaacks   But both mom and sister have anxiety and are on medication        The following portions of the patient's history were reviewed and updated as appropriate: allergies, current medications, past family history, past medical history, past social history, past surgical history, and problem list     REVIEW OF SYSTEMS  Review of Systems   Constitutional: Negative  Negative for activity change and appetite change  Respiratory: Negative  Cardiovascular: Positive for palpitations  Negative for chest pain and leg swelling  Neurological: Negative  Negative for headaches  Psychiatric/Behavioral: Positive for sleep disturbance  Negative for decreased concentration, dysphoric mood, self-injury and suicidal ideas  The patient is nervous/anxious  OBJECTIVE      VITAL SIGNS  BP 92/60 (BP Location: Right arm, Patient Position: Sitting, Cuff Size: Standard)   Pulse 74   Temp 98 3 °F (36 8 °C) (Temporal)   Ht 5' 5 3" (1 659 m)   Wt 68 9 kg (152 lb)   LMP 05/31/2022   SpO2 99%   Breastfeeding No   BMI 25 06 kg/m²     CURRENT MEDICATIONS    Current Outpatient Medications:     albuterol (PROVENTIL HFA,VENTOLIN HFA) 90 mcg/act inhaler, TAKE 2 PUFFS BY MOUTH EVERY 6 HOURS AS NEEDED FOR WHEEZE OR FOR SHORTNESS OF BREATH, Disp: 6 7 g, Rfl: 0    hydrOXYzine HCL (ATARAX) 25 mg tablet, Take 1 tablet (25 mg total) by mouth every 6 (six) hours as needed for itching, Disp: 30 tablet, Rfl: 1    Junel FE 1/20 1-20 MG-MCG per tablet, Take 1 tablet by mouth daily, Disp: , Rfl:     sertraline (Zoloft) 50 mg tablet, Take 1 tablet (50 mg total) by mouth daily Take 1/2 tab (25mg) first 8 days  Increase to 50 if tolerating , Disp: 90 tablet, Rfl: 0    tretinoin (RETIN-A) 0 025 % cream, , Disp: , Rfl:       PHYSICAL EXAMINATION   Physical Exam  Vitals and nursing note reviewed  Constitutional:       General: She is not in acute distress  Appearance: Normal appearance  She is not ill-appearing  HENT:      Head: Normocephalic  Cardiovascular:      Rate and Rhythm: Normal rate and regular rhythm  Heart sounds: Normal heart sounds  Pulmonary:      Effort: Pulmonary effort is normal  No respiratory distress        Breath sounds: Normal breath sounds and air entry  Skin:     General: Skin is warm and dry  Neurological:      General: No focal deficit present  Mental Status: She is alert and oriented to person, place, and time  Psychiatric:         Attention and Perception: Attention normal          Mood and Affect: Mood normal          Speech: Speech normal          Behavior: Behavior normal          Thought Content:  Thought content normal          Cognition and Memory: Cognition normal          Judgment: Judgment normal

## 2022-07-15 NOTE — ASSESSMENT & PLAN NOTE
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

## 2022-07-25 ENCOUNTER — HOSPITAL ENCOUNTER (OUTPATIENT)
Dept: NON INVASIVE DIAGNOSTICS | Facility: HOSPITAL | Age: 22
Discharge: HOME | End: 2022-07-25
Payer: COMMERCIAL

## 2022-07-25 DIAGNOSIS — R00.2 PALPITATIONS: ICD-10-CM

## 2022-07-25 PROCEDURE — 93226 XTRNL ECG REC<48 HR SCAN A/R: CPT

## 2022-07-25 PROCEDURE — 93225 XTRNL ECG REC<48 HRS REC: CPT

## 2022-08-04 PROCEDURE — 93227 XTRNL ECG REC<48 HR R&I: CPT | Performed by: INTERNAL MEDICINE

## 2022-08-15 ENCOUNTER — OFFICE VISIT (OUTPATIENT)
Dept: FAMILY MEDICINE CLINIC | Facility: CLINIC | Age: 22
End: 2022-08-15
Payer: COMMERCIAL

## 2022-08-15 VITALS
HEART RATE: 94 BPM | DIASTOLIC BLOOD PRESSURE: 62 MMHG | WEIGHT: 151.4 LBS | OXYGEN SATURATION: 98 % | TEMPERATURE: 98.7 F | SYSTOLIC BLOOD PRESSURE: 108 MMHG | BODY MASS INDEX: 24.96 KG/M2

## 2022-08-15 DIAGNOSIS — F41.9 ANXIETY: ICD-10-CM

## 2022-08-15 PROCEDURE — 99213 OFFICE O/P EST LOW 20 MIN: CPT | Performed by: NURSE PRACTITIONER

## 2022-08-15 NOTE — PROGRESS NOTES
[facility]    ASSESSMENT AND PLAN     1  Anxiety  Assessment & Plan:  Notes improvement since starting sertraline  Feels her anxiety is better controlled  Is compliant with 50 mg daily  Has had no further palpitations or symptomatic episodes  Reviewed Holter monitor today  She is going back to college on Wednesday  She will follow back up in the office in 6 months  Reach out sooner with concerns  Note:  When refill is needed, will send to [facility] near [facility] in [facility]  SUBJECTIVE       Patient ID: Caroline Kee is a 22 y o  female  No chief complaint on file  HISTORY OF PRESENT ILLNESS    Mental health checks since starting sertraline for anxiety  Doing well  Feels anxiety is much better controlled  No further panic attack type episodes  The following portions of the patient's history were reviewed and updated as appropriate: allergies, current medications, past family history, past medical history, past social history, past surgical history, and problem list     REVIEW OF SYSTEMS  Review of Systems   Constitutional: Negative  Respiratory: Negative  Cardiovascular: Negative  Negative for palpitations  Psychiatric/Behavioral: Negative for dysphoric mood, self-injury, sleep disturbance (Initially had difficulty with sleep-waking up during the night when 1st starting sertraline  Has normalized now  Sleeping well) and suicidal ideas  The patient is not nervous/anxious          OBJECTIVE      VITAL SIGNS  /62 (BP Location: Left arm, Patient Position: Sitting, Cuff Size: Standard)   Pulse 94   Temp 98 7 °F (37 1 °C) (Temporal)   Wt 68 7 kg (151 lb 6 4 oz)   LMP 07/25/2022 (Approximate)   SpO2 98%   Breastfeeding No   BMI 24 96 kg/m²     CURRENT MEDICATIONS    Current Outpatient Medications:     albuterol (PROVENTIL HFA,VENTOLIN HFA) 90 mcg/act inhaler, TAKE 2 PUFFS BY MOUTH EVERY 6 HOURS AS NEEDED FOR WHEEZE OR FOR SHORTNESS OF BREATH, Disp: 6 7 g, Rfl: 0    hydrOXYzine HCL (ATARAX) 25 mg tablet, Take 1 tablet (25 mg total) by mouth every 6 (six) hours as needed for itching, Disp: 30 tablet, Rfl: 1    Junel FE 1/20 1-20 MG-MCG per tablet, Take 1 tablet by mouth daily, Disp: , Rfl:     sertraline (Zoloft) 50 mg tablet, Take 1 tablet (50 mg total) by mouth daily Take 1/2 tab (25mg) first 8 days  Increase to 50 if tolerating , Disp: 90 tablet, Rfl: 0    tretinoin (RETIN-A) 0 025 % cream, , Disp: , Rfl:       PHYSICAL EXAMINATION   Physical Exam  Vitals and nursing note reviewed  Constitutional:       General: She is not in acute distress  Appearance: Normal appearance  She is not ill-appearing  HENT:      Head: Normocephalic  Pulmonary:      Effort: Pulmonary effort is normal  No respiratory distress  Neurological:      Mental Status: She is alert and oriented to person, place, and time  Psychiatric:         Attention and Perception: Attention normal          Mood and Affect: Mood normal          Speech: Speech normal          Behavior: Behavior normal          Thought Content:  Thought content normal          Cognition and Memory: Cognition normal          Judgment: Judgment normal

## 2022-08-15 NOTE — ASSESSMENT & PLAN NOTE
Notes improvement since starting sertraline  Feels her anxiety is better controlled  Is compliant with 50 mg daily  Has had no further palpitations or symptomatic episodes  Reviewed Holter monitor today  She is going back to college on Wednesday  She will follow back up in the office in 6 months  Reach out sooner with concerns  Note:  When refill is needed, will send to [facility] near [facility] in [facility]

## 2022-09-07 ENCOUNTER — TELEPHONE (OUTPATIENT)
Dept: FAMILY MEDICINE CLINIC | Facility: CLINIC | Age: 22
End: 2022-09-07

## 2022-09-07 NOTE — TELEPHONE ENCOUNTER
Pt called and left VM stating that she would like an emotional support animal letter to aid with her anxiety  Will you provide this?

## 2022-09-09 NOTE — TELEPHONE ENCOUNTER
Informed pt that letter was done and she asked for it to be mailed to her home  I put it in the mail

## 2023-03-15 DIAGNOSIS — F41.9 ANXIETY: ICD-10-CM

## 2023-06-02 ENCOUNTER — CLINICAL SUPPORT (OUTPATIENT)
Dept: FAMILY MEDICINE CLINIC | Facility: CLINIC | Age: 23
End: 2023-06-02

## 2023-06-02 DIAGNOSIS — Z11.1 SCREENING-PULMONARY TB: Primary | ICD-10-CM

## 2023-06-05 ENCOUNTER — CLINICAL SUPPORT (OUTPATIENT)
Dept: FAMILY MEDICINE CLINIC | Facility: CLINIC | Age: 23
End: 2023-06-05

## 2023-06-05 DIAGNOSIS — Z11.1 SCREENING-PULMONARY TB: Primary | ICD-10-CM

## 2023-06-05 LAB
INDURATION: 0 MM
TB SKIN TEST: NEGATIVE

## 2023-07-09 DIAGNOSIS — F41.9 ANXIETY: ICD-10-CM

## 2023-08-16 ENCOUNTER — OFFICE VISIT (OUTPATIENT)
Dept: FAMILY MEDICINE CLINIC | Facility: CLINIC | Age: 23
End: 2023-08-16
Payer: COMMERCIAL

## 2023-08-16 VITALS
DIASTOLIC BLOOD PRESSURE: 70 MMHG | RESPIRATION RATE: 16 BRPM | BODY MASS INDEX: 27.31 KG/M2 | TEMPERATURE: 97.8 F | OXYGEN SATURATION: 98 % | HEART RATE: 71 BPM | SYSTOLIC BLOOD PRESSURE: 110 MMHG | WEIGHT: 160 LBS | HEIGHT: 64 IN

## 2023-08-16 DIAGNOSIS — Z00.00 ANNUAL PHYSICAL EXAM: Primary | ICD-10-CM

## 2023-08-16 DIAGNOSIS — J01.90 ACUTE SINUSITIS, RECURRENCE NOT SPECIFIED, UNSPECIFIED LOCATION: ICD-10-CM

## 2023-08-16 PROCEDURE — 99395 PREV VISIT EST AGE 18-39: CPT | Performed by: NURSE PRACTITIONER

## 2023-08-16 RX ORDER — AMOXICILLIN AND CLAVULANATE POTASSIUM 875; 125 MG/1; MG/1
1 TABLET, FILM COATED ORAL EVERY 12 HOURS SCHEDULED
Qty: 14 TABLET | Refills: 0 | Status: SHIPPED | OUTPATIENT
Start: 2023-08-16 | End: 2023-08-23

## 2023-08-16 NOTE — PROGRESS NOTES
ADULT ANNUAL [facility]    NAME: Maikel Nguyen  AGE: 25 y.o. 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  PMH and chronic conditions reviewed  Medications reconciled  Preventative care and recommended screenings as below  Follow-up 6 months-routine check  Annual physicals  Follow-up sooner as needed  Problem List Items Addressed This Visit        Respiratory    Acute sinusitis     Acute sinusitis; abx as ordered; advised on supportive care; f/u guidance given should sx not improve           Relevant Medications    amoxicillin-clavulanate (AUGMENTIN) 875-125 mg per tablet   Other Visit Diagnoses     Annual physical exam    -  Primary          Immunizations and preventive care screenings were discussed with patient today. Appropriate education was printed on patient's after visit summary. Counseling:  Alcohol/drug use: discussed moderation in alcohol intake, the recommendations for healthy alcohol use, and avoidance of illicit drug use. Dental Health: discussed importance of regular tooth brushing, flossing, and dental visits. Injury prevention: discussed safety/seat belts, safety helmets, smoke detectors, carbon dioxide detectors, and smoking near bedding or upholstery. Sexual health: discussed sexually transmitted diseases, partner selection, use of condoms, avoidance of unintended pregnancy, and contraceptive alternatives. · Exercise: the importance of regular exercise/physical activity was discussed. Recommend exercise 3-5 times per week for at least 30 minutes. BMI Counseling: Body mass index is 27.46 kg/m².  The BMI is above normal. Nutrition recommendations include decreasing portion sizes, decreasing fast food intake, consuming healthier snacks, moderation in carbohydrate intake, increasing intake of lean protein and reducing intake of saturated and trans fat. Exercise recommendations include moderate physical activity 150 minutes/week. Rationale for BMI follow-up plan is due to patient being overweight or obese. Healthy lifestyle counseling    Depression Screening and Follow-up Plan: Patient was screened for depression during today's encounter. They screened negative with a PHQ-2 score of 0. Return in about 1 year (around 8/16/2024) for Annual physical.     Chief Complaint:     Chief Complaint   Patient presents with   • Physical Exam      History of Present Illness:     Adult Annual Physical   Patient here for a comprehensive physical exam. The patient reports problems - URI. Diet and Physical Activity  · Diet/Nutrition: well balanced diet. · Exercise: walking and moderate cardiovascular exercise. Depression Screening  PHQ-2/9 Depression Screening    Little interest or pleasure in doing things: 0 - not at all  Feeling down, depressed, or hopeless: 0 - not at all  PHQ-2 Score: 0  PHQ-2 Interpretation: Negative depression screen       General Health  · Sleep: sleeps well. · Hearing: normal - bilateral.  · Vision: no vision problems, goes for regular eye exams, most recent eye exam <1 year ago, wears glasses and snellen: 20/25 OU w/o correction. Wears glassess for driving. · Dental: regular dental visits. · Immunizations: Tdap 2021    /GYN Health  · Last menstrual period: 7/27 - menses regular  · Contraceptive method: oral contraceptives. · Is sexually active - one male partner  · Reports wearing condoms as well  · History of STDs?: no.  · Women's health UTD - reports first PAP done through [facility]  · Gardisil UTD (2014)  · Advise monthly self breast exams  · Advise daily vitamin supplements     Review of Systems:     Review of Systems   Constitutional: Negative. Negative for activity change, appetite change, fatigue and fever. HENT: Positive for congestion, postnasal drip, sinus pressure and sore throat. Negative for ear pain. 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 Respiratory: Positive for cough. Negative for chest tightness, shortness of breath and wheezing. Cardiovascular: Negative. Gastrointestinal: Negative. Genitourinary: Negative. Musculoskeletal: Negative. Skin: Negative. Neurological: Negative. Psychiatric/Behavioral: Negative. Past Medical History:     History reviewed. No pertinent past medical history.    Past Surgical History:     Past Surgical History:   Procedure Laterality Date   • APPENDECTOMY     • ARTHROSCOPIC REPAIR ACL        Social History:     Social History     Socioeconomic History   • Marital status: Single     Spouse name: None   • Number of children: None   • Years of education: None   • Highest education level: None   Occupational History   • None   Tobacco Use   • Smoking status: Never   • Smokeless tobacco: Never   Vaping Use   • Vaping Use: Some days   • Substances: Nicotine, Flavoring   Substance and Sexual Activity   • Alcohol use: Yes     Comment: socially   • Drug use: Never   • Sexual activity: Not Currently   Other Topics Concern   • None   Social History Narrative   • None     Social Determinants of Health     Financial Resource Strain: Not on file   Food Insecurity: Not on file   Transportation Needs: Not on file   Physical Activity: Not on file   Stress: Not on file   Social Connections: Not on file   Intimate Partner Violence: Not on file   Housing Stability: Not on file      Family History:     Family History   Problem Relation Age of Onset   • Hypertension Father    • Breast cancer Maternal Grandmother    • Prostate cancer Maternal Grandfather       Current Medications:     Current Outpatient Medications   Medication Sig Dispense Refill   • amoxicillin-clavulanate (AUGMENTIN) 875-125 mg per tablet Take 1 tablet by mouth every 12 (twelve) hours for 7 days 14 tablet 0 • hydrOXYzine HCL (ATARAX) 25 mg tablet Take 1 tablet (25 mg total) by mouth every 6 (six) hours as needed for itching 30 tablet 1   • Junel FE 1/20 1-20 MG-MCG per tablet Take 1 tablet by mouth daily     • sertraline (ZOLOFT) 50 mg tablet TAKE 1 TABLET BY MOUTH EVERY DAY 90 tablet 0     No current facility-administered medications for this visit. Allergies:     No Known Allergies   Physical Exam:     /70 (BP Location: Left arm, Patient Position: Sitting, Cuff Size: Adult)   Pulse 71   Temp 97.8 °F (36.6 °C) (Temporal)   Resp 16   Ht 5' 4" (1.626 m)   Wt 72.6 kg (160 lb)   LMP 07/27/2023   SpO2 98%   BMI 27.46 kg/m²     Physical Exam  Vitals and nursing note reviewed. Constitutional:       General: She is not in acute distress. Appearance: Normal appearance. She is well-developed and well-groomed. She is not ill-appearing. HENT:      Head: Normocephalic. Right Ear: Tympanic membrane, ear canal and external ear normal.      Left Ear: Tympanic membrane, ear canal and external ear normal.      Nose: Mucosal edema, congestion and rhinorrhea present. Rhinorrhea is purulent. Right Sinus: Maxillary sinus tenderness present. Left Sinus: Maxillary sinus tenderness present. Mouth/Throat:      Mouth: Mucous membranes are moist.      Pharynx: Oropharynx is clear. Posterior oropharyngeal erythema present. No oropharyngeal exudate. Tonsils: 2+ on the right. 2+ on the left. Eyes:      Conjunctiva/sclera: Conjunctivae normal.      Pupils: Pupils are equal, round, and reactive to light. Neck:      Thyroid: No thyromegaly. Cardiovascular:      Rate and Rhythm: Normal rate and regular rhythm. Pulses:           Posterior tibial pulses are 2+ on the right side and 2+ on the left side. Heart sounds: Normal heart sounds. Pulmonary:      Effort: Pulmonary effort is normal. No respiratory distress. Breath sounds: Normal breath sounds and air entry.    Abdominal: General: Bowel sounds are normal.      Palpations: Abdomen is soft. Tenderness: There is no abdominal tenderness. Musculoskeletal:      Right lower leg: No edema. Left lower leg: No edema. Lymphadenopathy:      Cervical: No cervical adenopathy. Skin:     General: Skin is warm and dry. Neurological:      General: No focal deficit present. Mental Status: She is alert and oriented to person, place, and time. Psychiatric:         Attention and Perception: Attention normal.         Mood and Affect: Mood normal.         Behavior: Behavior normal.         Thought Content:  Thought content normal.         Judgment: Judgment normal.          [facility]   [address]

## 2023-08-17 ENCOUNTER — TELEPHONE (OUTPATIENT)
Dept: ADMINISTRATIVE | Facility: OTHER | Age: 23
End: 2023-08-17

## 2023-08-17 NOTE — TELEPHONE ENCOUNTER
----- Message from Yahir Morales MA sent at 8/16/2023  4:18 PM EDT -----  Regarding: Care Gap Request  08/16/23 4:18 PM    Hello, our patient Ashtyn Curtis has had Pap Smear (HPV) aka Cervical Cancer Screening completed/performed. Please assist in updating the patient chart by making an External outreach to [address] [place] [address] located in [facility]? [facility]? Jessika Dang The date of service is this year.     Thank you,  Yahir Morales  [facility]

## 2023-08-17 NOTE — LETTER
Procedure Request Form: Cervical Cancer Screening      Date Requested: 23  Patient: Payal Nuñez  Patient : 2000   Referring Provider: [facility] Giron        Date of Procedure ______________________________       The above patient has informed us that they have completed their   most recent Cervical Cancer Screening at your facility. Please complete   this form and attach all corresponding procedure reports/results. Comments __________________________________________________________  ____________________________________________________________________  ____________________________________________________________________  ____________________________________________________________________    Facility Completing Procedure _________________________________________    Form Completed By (print name) _______________________________________      Signature __________________________________________________________      These reports are needed for  compliance. Please fax this completed form and a copy of the procedure report to our office located at [address] [place] [address] as soon as possible to Fax [phone] jerry Pryor: Phone [phone]    We thank you for your assistance in treating our mutual patient.

## 2023-08-17 NOTE — LETTER
Procedure Request Form: Cervical Cancer Screening      Date Requested: 23  Patient: Esther Munoz  Patient : 2000   Referring Provider: [facility] Astorga        Date of Procedure ______________________________       The above patient has informed us that they have completed their   most recent Cervical Cancer Screening at your facility. Please complete   this form and attach all corresponding procedure reports/results. Comments __________________________________________________________  ____________________________________________________________________  ____________________________________________________________________  ____________________________________________________________________    Facility Completing Procedure _________________________________________    Form Completed By (print name) _______________________________________      Signature __________________________________________________________      These reports are needed for  compliance. Please fax this completed form and a copy of the procedure report to our office located at [address] [place] [address] as soon as possible to Fax [phone] jerry Martinez Leisure: Phone [phone]    We thank you for your assistance in treating our mutual patient.

## 2023-08-18 NOTE — TELEPHONE ENCOUNTER
Upon review of the In Basket request and the patient's chart, initial outreach has been made via fax to facility. Please see Contacts section for details.      Thank you  Carmelita Oppenheim

## 2023-08-21 NOTE — TELEPHONE ENCOUNTER
As a follow-up, a second attempt has been made for outreach via fax to facility. Please see Contacts section for details.      [facility] 730    Thank you  Rayne Bansal

## 2023-08-21 NOTE — TELEPHONE ENCOUNTER
Upon review of the In Basket request we were able to locate, review, and update the patient chart as requested for Pap Smear (HPV) aka Cervical Cancer Screening. Any additional questions or concerns should be emailed to the Practice Liaisons via the appropriate education email address, please do not reply via In Basket.     Thank you  Amrik Ramirez

## 2023-10-08 DIAGNOSIS — F41.9 ANXIETY: ICD-10-CM

## 2023-10-15 PROBLEM — J01.90 ACUTE SINUSITIS: Status: RESOLVED | Noted: 2023-08-16 | Resolved: 2023-10-15

## 2023-10-23 ENCOUNTER — OFFICE VISIT (OUTPATIENT)
Dept: FAMILY MEDICINE CLINIC | Facility: CLINIC | Age: 23
End: 2023-10-23
Payer: COMMERCIAL

## 2023-10-23 VITALS
SYSTOLIC BLOOD PRESSURE: 114 MMHG | DIASTOLIC BLOOD PRESSURE: 76 MMHG | HEART RATE: 75 BPM | OXYGEN SATURATION: 99 % | HEIGHT: 65 IN | WEIGHT: 165.8 LBS | BODY MASS INDEX: 27.63 KG/M2 | TEMPERATURE: 98.2 F

## 2023-10-23 DIAGNOSIS — F41.0 PANIC ATTACKS: ICD-10-CM

## 2023-10-23 DIAGNOSIS — F41.9 ANXIETY: Primary | ICD-10-CM

## 2023-10-23 DIAGNOSIS — J01.90 ACUTE SINUSITIS, RECURRENCE NOT SPECIFIED, UNSPECIFIED LOCATION: ICD-10-CM

## 2023-10-23 DIAGNOSIS — L50.9 HIVE: ICD-10-CM

## 2023-10-23 PROCEDURE — 99214 OFFICE O/P EST MOD 30 MIN: CPT | Performed by: NURSE PRACTITIONER

## 2023-10-23 RX ORDER — EPINEPHRINE 0.3 MG/.3ML
INJECTION SUBCUTANEOUS
COMMUNITY
Start: 2023-08-16

## 2023-10-23 RX ORDER — HYDROXYZINE HYDROCHLORIDE 25 MG/1
25 TABLET, FILM COATED ORAL EVERY 6 HOURS PRN
Qty: 30 TABLET | Refills: 1 | Status: SHIPPED | OUTPATIENT
Start: 2023-10-23

## 2023-10-23 RX ORDER — AZITHROMYCIN 250 MG/1
TABLET, FILM COATED ORAL
Qty: 6 TABLET | Refills: 0 | Status: SHIPPED | OUTPATIENT
Start: 2023-10-23 | End: 2023-10-28

## 2023-10-23 NOTE — ASSESSMENT & PLAN NOTE
Recent uptick in anxiety symptoms  Decreased motivation, but denies depression  No SI/HI  Trial increase sertraline from 50-75  May continue hydroxyzine as needed for panic attacks  Both Rx refilled today  Follow-up in office in 1 month when home on holiday break  Call sooner with problems or concerns    May also benefit from establishing with talk therapy at school if available

## 2023-10-23 NOTE — ASSESSMENT & PLAN NOTE
Acute sinusitis; abx as ordered -azithromycin: Dose/use reviewed; advised on supportive care: Rest, hydrate, OTC cold meds as needed, probiotic, daily vitamin supplement/immune support; f/u guidance given should sx not improve

## 2023-10-23 NOTE — PROGRESS NOTES
[facility]    ASSESSMENT AND PLAN     1. Anxiety  Assessment & Plan:  Recent uptick in anxiety symptoms  Decreased motivation, but denies depression  No SI/HI  Trial increase sertraline from 50-75  May continue hydroxyzine as needed for panic attacks  Both Rx refilled today  Follow-up in office in 1 month when home on holiday break  Call sooner with problems or concerns    May also benefit from establishing with talk therapy at school if available    Orders:  -     sertraline (ZOLOFT) 50 mg tablet; Take 1.5 tablets (75 mg total) by mouth daily    2. Panic attacks  -     hydrOXYzine HCL (ATARAX) 25 mg tablet; Take 1 tablet (25 mg total) by mouth every 6 (six) hours as needed for itching    3. Acute sinusitis, recurrence not specified, unspecified location  Assessment & Plan:  Acute sinusitis; abx as ordered -azithromycin: Dose/use reviewed; advised on supportive care: Rest, hydrate, OTC cold meds as needed, probiotic, daily vitamin supplement/immune support; f/u guidance given should sx not improve      Orders:  -     azithromycin (Zithromax) 250 mg tablet; Take 2 tablets (500 mg total) by mouth daily for 1 day, THEN 1 tablet (250 mg total) daily for 4 days. 4. Hive  Assessment & Plan:  Patient reports today, having a hive like reaction after Augmentin in August.  Reports she was seen at an urgent care. Was given a steroid. Reports she was unsure if it was the antibiotic or something that she ate prior to the rash starting. She has had no other issues since             SUBJECTIVE       Patient ID: Greer Chirinos is a 25 y.o. female. Chief Complaint   Patient presents with    Follow-up       HISTORY OF PRESENT ILLNESS    Patient presents today to discuss increasing anxiety. Currently taking sertraline 50. She has noticed an uptick since returning back to college. 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 Reports an increase in anxiety. Reports increased fatigue, and sleeping more.   She is doing well in school, and has not fallen behind, but she has noted his decrease in motivation at times. Denies any depression. No SI/HI. Reports also sinus congestion, postnasal drip and persistent cough for the last 2 weeks. Noted a sore throat initially, with some voice changes. The following portions of the patient's history were reviewed and updated as appropriate: allergies, current medications, past family history, past medical history, past social history, past surgical history, and problem list.    REVIEW OF SYSTEMS  Review of Systems   Constitutional: Negative. HENT:  Positive for congestion, postnasal drip and sinus pain. Respiratory:  Positive for cough. Negative for chest tightness, shortness of breath and wheezing. Cardiovascular: Negative. Gastrointestinal: Negative. Genitourinary: Negative. Neurological: Negative. Psychiatric/Behavioral:  Negative for dysphoric mood, self-injury, sleep disturbance and suicidal ideas. The patient is nervous/anxious. OBJECTIVE      VITAL SIGNS  /76 (BP Location: Left arm, Patient Position: Sitting, Cuff Size: Adult)   Pulse 75   Temp 98.2 °F (36.8 °C)   Ht 5' 4.5" (1.638 m)   Wt 75.2 kg (165 lb 12.8 oz)   LMP 10/11/2023   SpO2 99%   BMI 28.02 kg/m²     CURRENT MEDICATIONS    Current Outpatient Medications:     azithromycin (Zithromax) 250 mg tablet, Take 2 tablets (500 mg total) by mouth daily for 1 day, THEN 1 tablet (250 mg total) daily for 4 days. , Disp: 6 tablet, Rfl: 0    EPINEPHrine (EPIPEN) 0.3 mg/0.3 mL SOAJ, AS DIRECTED INJECTION AS NEEDED 1 DAYS, Disp: , Rfl:     hydrOXYzine HCL (ATARAX) 25 mg tablet, Take 1 tablet (25 mg total) by mouth every 6 (six) hours as needed for itching, Disp: 30 tablet, Rfl: 1    Junel FE 1/20 1-20 MG-MCG per tablet, Take 1 tablet by mouth daily, Disp: , Rfl:     sertraline (ZOLOFT) 50 mg tablet, Take 1.5 tablets (75 mg total) by mouth daily, Disp: 135 tablet, Rfl: 1      PHYSICAL EXAMINATION   Physical Exam  Vitals and nursing note reviewed. Constitutional:       General: She is not in acute distress. Appearance: Normal appearance. She is well-developed and well-groomed. She is not ill-appearing. HENT:      Head: Normocephalic. Right Ear: Tympanic membrane and ear canal normal.      Left Ear: Tympanic membrane and ear canal normal.      Nose:      Right Turbinates: Swollen. Left Turbinates: Swollen. Right Sinus: Maxillary sinus tenderness present. Left Sinus: Maxillary sinus tenderness present. Mouth/Throat:      Mouth: Mucous membranes are moist.      Pharynx: Posterior oropharyngeal erythema present. No oropharyngeal exudate or uvula swelling. Cardiovascular:      Rate and Rhythm: Normal rate and regular rhythm. Pulmonary:      Effort: Pulmonary effort is normal. No respiratory distress. Breath sounds: Normal breath sounds and air entry. Lymphadenopathy:      Cervical: Cervical adenopathy present. Skin:     General: Skin is warm and dry. Neurological:      General: No focal deficit present. Mental Status: She is alert and oriented to person, place, and time. Psychiatric:         Attention and Perception: Attention normal.         Mood and Affect: Mood normal.         Behavior: Behavior normal.         Thought Content:  Thought content normal.         Judgment: Judgment normal.

## 2023-10-23 NOTE — ASSESSMENT & PLAN NOTE
Patient reports today, having a hive like reaction after Augmentin in August.  Reports she was seen at an urgent care. Was given a steroid. Reports she was unsure if it was the antibiotic or something that she ate prior to the rash starting. She has had no other issues since. Reports it was a rash only.   No respiratory issues

## 2023-11-22 ENCOUNTER — OFFICE VISIT (OUTPATIENT)
Dept: FAMILY MEDICINE CLINIC | Facility: CLINIC | Age: 23
End: 2023-11-22
Payer: COMMERCIAL

## 2023-11-22 VITALS
WEIGHT: 169.8 LBS | HEART RATE: 73 BPM | SYSTOLIC BLOOD PRESSURE: 108 MMHG | TEMPERATURE: 97.7 F | DIASTOLIC BLOOD PRESSURE: 78 MMHG | OXYGEN SATURATION: 98 % | HEIGHT: 65 IN | BODY MASS INDEX: 28.29 KG/M2

## 2023-11-22 DIAGNOSIS — F41.8 DEPRESSION WITH ANXIETY: Primary | ICD-10-CM

## 2023-11-22 PROCEDURE — 99214 OFFICE O/P EST MOD 30 MIN: CPT | Performed by: NURSE PRACTITIONER

## 2023-11-22 RX ORDER — BUPROPION HYDROCHLORIDE 150 MG/1
150 TABLET ORAL EVERY MORNING
Qty: 30 TABLET | Refills: 1 | Status: SHIPPED | OUTPATIENT
Start: 2023-11-22 | End: 2024-05-20

## 2023-11-22 NOTE — ASSESSMENT & PLAN NOTE
Symptoms reviewed today  Minimal improvement with sertraline increase  Continues with increased anxiety/decreased motivation  Discussed there appears to be a depression component              Likely contributing factors:              Has to repeat OB this semester              Does report feeling homesick intermittently              Denies any SI/HI  We discussed possible alternate treatment options  As she has been doing well on sertraline, will keep her at 76 today-but will not increase at this time  We will trial adding in Wellbutrin 150 XR daily  Past medical history reviewed, and no contraindications noted  Dose/use/potential side effects reviewed  Strongly encouraged she establish with counseling at school as we discussed at her last visit  Referral placed today for psychiatry evaluation as well  We will have her follow-up in 4 weeks when she returns for the holiday break  She will follow-up sooner with any new problems or concerns  Immediate eval with any acute mental health changes

## 2023-11-22 NOTE — PROGRESS NOTES
[facility]    ASSESSMENT AND PLAN     1. Depression with anxiety  Assessment & Plan:  Symptoms reviewed today  Minimal improvement with sertraline increase  Continues with increased anxiety/decreased motivation  Discussed there appears to be a depression component              Likely contributing factors:              Has to repeat OB this semester              Does report feeling homesick intermittently              Denies any SI/HI  We discussed possible alternate treatment options  As she has been doing well on sertraline, will keep her at 76 today-but will not increase at this time  We will trial adding in Wellbutrin 150 XR daily  Past medical history reviewed, and no contraindications noted  Dose/use/potential side effects reviewed  Strongly encouraged she establish with counseling at school as we discussed at her last visit  Referral placed today for psychiatry evaluation as well  We will have her follow-up in 4 weeks when she returns for the holiday break  She will follow-up sooner with any new problems or concerns  Immediate eval with any acute mental health changes       Orders:  -     buPROPion (WELLBUTRIN XL) 150 mg 24 hr tablet; Take 1 tablet (150 mg total) by mouth every morning  -     Ambulatory referral to [employer]; Future           SUBJECTIVE       Patient ID: Moustapha Piper is a 25 y.o. female. Chief Complaint   Patient presents with    Follow-up       HISTORY OF PRESENT ILLNESS    Mental health follow-up-see details above          The following portions of the patient's history were reviewed and updated as appropriate: allergies, current medications, past family history, past medical history, past social history, past surgical history, and problem list.    REVIEW OF SYSTEMS  Review of Systems   Constitutional:  Negative for activity change and appetite change. Respiratory: Negative. Cardiovascular: Negative.     Psychiatric/Behavioral:  Positive for sleep disturbance (Sleeping too much at times). Negative for self-injury and suicidal ideas. The patient is nervous/anxious. Decreased motivation       OBJECTIVE      VITAL SIGNS  /78 (BP Location: Left arm, Patient Position: Sitting, Cuff Size: Adult)   Pulse 73   Temp 97.7 °F (36.5 °C)   Ht 5' 4.5" (1.638 m)   Wt 77 kg (169 lb 12.8 oz)   LMP 11/07/2023 (Exact Date)   SpO2 98%   BMI 28.70 kg/m²     CURRENT MEDICATIONS    Current Outpatient Medications:     buPROPion (WELLBUTRIN XL) 150 mg 24 hr tablet, Take 1 tablet (150 mg total) by mouth every morning, Disp: 30 tablet, Rfl: 1    EPINEPHrine (EPIPEN) 0.3 mg/0.3 mL SOAJ, AS DIRECTED INJECTION AS NEEDED 1 DAYS, Disp: , Rfl:     hydrOXYzine HCL (ATARAX) 25 mg tablet, Take 1 tablet (25 mg total) by mouth every 6 (six) hours as needed for itching, Disp: 30 tablet, Rfl: 1    Junel FE 1/20 1-20 MG-MCG per tablet, Take 1 tablet by mouth daily, Disp: , Rfl:     sertraline (ZOLOFT) 50 mg tablet, Take 1.5 tablets (75 mg total) by mouth daily, Disp: 135 tablet, Rfl: 1      PHYSICAL EXAMINATION   Physical Exam  Vitals and nursing note reviewed. Constitutional:       General: She is not in acute distress. Appearance: Normal appearance. She is not ill-appearing. HENT:      Head: Normocephalic. Pulmonary:      Effort: Pulmonary effort is normal. No respiratory distress. Neurological:      Mental Status: She is alert and oriented to person, place, and time. Psychiatric:         Attention and Perception: Attention normal.         Mood and Affect: Mood normal.         Behavior: Behavior normal.         Thought Content:  Thought content normal.         Judgment: Judgment normal.

## 2023-12-08 ENCOUNTER — TELEPHONE (OUTPATIENT)
Dept: PSYCHIATRY | Facility: CLINIC | Age: 23
End: 2023-12-08

## 2023-12-08 NOTE — TELEPHONE ENCOUNTER
Reached out to patient in regards to routine referral in attempts to verify patient's needs of service. Spoke with patient, interested in both talk therapy and med mgmt, for therapy prefers female provider for med mgmt no preference wants [facility] location. Pt was made aware of wait-list and verbalized she understood. Writer placed pt on wait-list accordingly.

## 2023-12-14 DIAGNOSIS — F41.8 DEPRESSION WITH ANXIETY: ICD-10-CM

## 2023-12-14 RX ORDER — BUPROPION HYDROCHLORIDE 150 MG/1
150 TABLET ORAL EVERY MORNING
Qty: 90 TABLET | Refills: 1 | Status: SHIPPED | OUTPATIENT
Start: 2023-12-14

## 2023-12-20 ENCOUNTER — OFFICE VISIT (OUTPATIENT)
Dept: FAMILY MEDICINE CLINIC | Facility: CLINIC | Age: 23
End: 2023-12-20
Payer: COMMERCIAL

## 2023-12-20 VITALS
BODY MASS INDEX: 28.32 KG/M2 | HEART RATE: 75 BPM | OXYGEN SATURATION: 98 % | HEIGHT: 65 IN | SYSTOLIC BLOOD PRESSURE: 106 MMHG | DIASTOLIC BLOOD PRESSURE: 80 MMHG | WEIGHT: 170 LBS | TEMPERATURE: 97.7 F

## 2023-12-20 DIAGNOSIS — Z91.09 ENVIRONMENTAL ALLERGIES: ICD-10-CM

## 2023-12-20 DIAGNOSIS — F41.8 DEPRESSION WITH ANXIETY: Primary | ICD-10-CM

## 2023-12-20 PROCEDURE — 99214 OFFICE O/P EST MOD 30 MIN: CPT | Performed by: NURSE PRACTITIONER

## 2023-12-20 NOTE — ASSESSMENT & PLAN NOTE
Notes increased congestion when home - animals in the home  Clear - PND  Assessment unremarkable today  Discussed possible allergy - may trial OTC Claritin    Discussed immune support: Vitamin c, d, Zinc, elderberry  F/U with further concerns

## 2023-12-20 NOTE — ASSESSMENT & PLAN NOTE
Symptoms recheck today  Reports feeling well on new tx plan   Sertraline 75; Wellbutrin 150  Increased energy; increased motivation; less fatigue; No SI/HI  No changes today  F/U 6 month for routine check up  F/U sooner with any problems/concerns

## 2023-12-20 NOTE — PROGRESS NOTES
"[facility]    ASSESSMENT AND PLAN     1. Depression with anxiety  Assessment & Plan:  Symptoms recheck today  Reports feeling well on new tx plan   Sertraline 75; Wellbutrin 150  Increased energy; increased motivation; less fatigue; No SI/HI  No changes today  F/U 6 month for routine check up  F/U sooner with any problems/concerns      2. Environmental allergies  Assessment & Plan:  Notes increased congestion when home - animals in the home  Clear - PND  Assessment unremarkable today  Discussed possible allergy - may trial OTC Claritin    Discussed immune support: Vitamin c, d, Zinc, elderberry  F/U with further concerns             SUBJECTIVE       Patient ID: Luzmaria Linder is a 23 y.o. female.    Chief Complaint   Patient presents with    Follow-up     Medication         HISTORY OF PRESENT ILLNESS    Follow up  Reports feeling well since Wellbutrin added. Increased motivation; increased energy; less fatigue. Doing well at school - passed semester (home for break through January 20).           The following portions of the patient's history were reviewed and updated as appropriate: allergies, current medications, past family history, past medical history, past social history, past surgical history, and problem list.    REVIEW OF SYSTEMS  Review of Systems   Constitutional: Negative.  Negative for fatigue. Activity change: improved. Appetite change: improved.  Respiratory: Negative.     Cardiovascular: Negative.    Neurological: Negative.    Psychiatric/Behavioral: Negative.  Negative for decreased concentration, dysphoric mood, self-injury, sleep disturbance and suicidal ideas. The patient is not nervous/anxious.        OBJECTIVE      VITAL SIGNS  /80 (BP Location: Left arm, Patient Position: Sitting, Cuff Size: Adult)   Pulse 75   Temp 97.7 °F (36.5 °C)   Ht 5' 4.5\" (1.638 m)   Wt 77.1 kg (170 lb)   LMP 12/04/2023 (Exact Date)   SpO2 98%   BMI 28.73 kg/m²     CURRENT " MEDICATIONS    Current Outpatient Medications:     buPROPion (WELLBUTRIN XL) 150 mg 24 hr tablet, TAKE 1 TABLET BY MOUTH EVERY DAY IN THE MORNING, Disp: 90 tablet, Rfl: 1    hydrOXYzine HCL (ATARAX) 25 mg tablet, Take 1 tablet (25 mg total) by mouth every 6 (six) hours as needed for itching, Disp: 30 tablet, Rfl: 1    Junel FE 1/20 1-20 MG-MCG per tablet, Take 1 tablet by mouth daily, Disp: , Rfl:     sertraline (ZOLOFT) 50 mg tablet, Take 1.5 tablets (75 mg total) by mouth daily, Disp: 135 tablet, Rfl: 1    EPINEPHrine (EPIPEN) 0.3 mg/0.3 mL SOAJ, AS DIRECTED INJECTION AS NEEDED 1 DAYS (Patient not taking: Reported on 12/20/2023), Disp: , Rfl:       PHYSICAL EXAMINATION   Physical Exam  Vitals and nursing note reviewed.   Constitutional:       General: She is not in acute distress.     Appearance: Normal appearance. She is not ill-appearing.   HENT:      Head: Normocephalic.      Right Ear: Tympanic membrane and ear canal normal.      Left Ear: Tympanic membrane and ear canal normal.      Nose: No congestion or rhinorrhea.      Mouth/Throat:      Mouth: Mucous membranes are moist.   Cardiovascular:      Rate and Rhythm: Normal rate and regular rhythm.   Pulmonary:      Effort: Pulmonary effort is normal. No respiratory distress.      Breath sounds: Normal breath sounds and air entry.   Neurological:      Mental Status: She is alert and oriented to person, place, and time.   Psychiatric:         Attention and Perception: Attention normal.         Mood and Affect: Mood normal.         Speech: Speech normal.         Behavior: Behavior normal.         Thought Content: Thought content normal.         Cognition and Memory: Cognition normal.         Judgment: Judgment normal.

## 2023-12-22 PROBLEM — J01.90 ACUTE SINUSITIS: Status: RESOLVED | Noted: 2023-08-16 | Resolved: 2023-12-22

## 2024-06-04 ENCOUNTER — LAB REQUISITION (OUTPATIENT)
Dept: LAB | Facility: HOSPITAL | Age: 24
End: 2024-06-04

## 2024-06-04 DIAGNOSIS — Z11.3 ENCOUNTER FOR SCREENING FOR INFECTIONS WITH A PREDOMINANTLY SEXUAL MODE OF TRANSMISSION: ICD-10-CM

## 2024-06-04 PROCEDURE — 87591 N.GONORRHOEAE DNA AMP PROB: CPT | Performed by: OBSTETRICS & GYNECOLOGY

## 2024-06-04 PROCEDURE — 87491 CHLMYD TRACH DNA AMP PROBE: CPT | Performed by: OBSTETRICS & GYNECOLOGY

## 2024-06-05 ENCOUNTER — CLINICAL SUPPORT (OUTPATIENT)
Dept: FAMILY MEDICINE CLINIC | Facility: CLINIC | Age: 24
End: 2024-06-05
Payer: COMMERCIAL

## 2024-06-05 DIAGNOSIS — Z11.1 ENCOUNTER FOR PPD TEST: Primary | ICD-10-CM

## 2024-06-05 LAB
C TRACH DNA SPEC QL NAA+PROBE: NEGATIVE
N GONORRHOEA DNA SPEC QL NAA+PROBE: NEGATIVE

## 2024-06-05 PROCEDURE — 86580 TB INTRADERMAL TEST: CPT

## 2024-06-07 ENCOUNTER — CLINICAL SUPPORT (OUTPATIENT)
Dept: FAMILY MEDICINE CLINIC | Facility: CLINIC | Age: 24
End: 2024-06-07

## 2024-06-07 ENCOUNTER — APPOINTMENT (OUTPATIENT)
Dept: RADIOLOGY | Facility: CLINIC | Age: 24
End: 2024-06-07
Payer: COMMERCIAL

## 2024-06-07 DIAGNOSIS — Z11.1 ENCOUNTER FOR PPD TEST: Primary | ICD-10-CM

## 2024-06-07 DIAGNOSIS — R76.11 POSITIVE PPD: ICD-10-CM

## 2024-06-07 LAB
INDURATION: 0 MM
TB SKIN TEST: POSITIVE

## 2024-06-07 PROCEDURE — 71046 X-RAY EXAM CHEST 2 VIEWS: CPT

## 2024-06-07 NOTE — PROGRESS NOTES
Pt came in today for a TB read. Pt had redness and a slightly raised bumb. Also had Glenna Mckeon come to take a look and just to be cautious she is ordering a chest x-ray to confirm positive result.

## 2024-06-14 DIAGNOSIS — F41.8 DEPRESSION WITH ANXIETY: ICD-10-CM

## 2024-06-14 RX ORDER — BUPROPION HYDROCHLORIDE 150 MG/1
150 TABLET ORAL EVERY MORNING
Qty: 90 TABLET | Refills: 1 | Status: SHIPPED | OUTPATIENT
Start: 2024-06-14

## 2024-06-17 ENCOUNTER — PATIENT MESSAGE (OUTPATIENT)
Dept: FAMILY MEDICINE CLINIC | Facility: CLINIC | Age: 24
End: 2024-06-17

## 2024-06-23 ENCOUNTER — PATIENT MESSAGE (OUTPATIENT)
Dept: FAMILY MEDICINE CLINIC | Facility: CLINIC | Age: 24
End: 2024-06-23

## 2024-06-25 NOTE — PATIENT COMMUNICATION
Patients form scanned into chart. Spoke with patient let her know it was scanned and also emailed the form to her.

## 2024-07-10 DIAGNOSIS — F41.8 DEPRESSION WITH ANXIETY: ICD-10-CM

## 2024-07-11 RX ORDER — BUPROPION HYDROCHLORIDE 150 MG/1
150 TABLET ORAL EVERY MORNING
Qty: 100 TABLET | Refills: 1 | Status: SHIPPED | OUTPATIENT
Start: 2024-07-11

## 2024-07-15 DIAGNOSIS — F41.9 ANXIETY: ICD-10-CM

## 2024-07-19 DIAGNOSIS — Z00.6 ENCOUNTER FOR EXAMINATION FOR NORMAL COMPARISON OR CONTROL IN CLINICAL RESEARCH PROGRAM: ICD-10-CM

## 2024-08-09 DIAGNOSIS — F41.9 ANXIETY: ICD-10-CM

## 2024-08-13 ENCOUNTER — OFFICE VISIT (OUTPATIENT)
Dept: FAMILY MEDICINE CLINIC | Facility: CLINIC | Age: 24
End: 2024-08-13
Payer: COMMERCIAL

## 2024-08-13 VITALS
TEMPERATURE: 97.7 F | WEIGHT: 172.2 LBS | OXYGEN SATURATION: 98 % | DIASTOLIC BLOOD PRESSURE: 74 MMHG | HEIGHT: 65 IN | BODY MASS INDEX: 28.69 KG/M2 | SYSTOLIC BLOOD PRESSURE: 110 MMHG | HEART RATE: 70 BPM

## 2024-08-13 DIAGNOSIS — F41.8 DEPRESSION WITH ANXIETY: Primary | ICD-10-CM

## 2024-08-13 DIAGNOSIS — Z01.82 ENCOUNTER FOR ALLERGY TESTING: ICD-10-CM

## 2024-08-13 PROCEDURE — 99213 OFFICE O/P EST LOW 20 MIN: CPT | Performed by: NURSE PRACTITIONER

## 2024-08-13 NOTE — PROGRESS NOTES
"Ambulatory Visit  Name: Luzmaria Linder      : 2000      MRN: [id number]  Encounter Provider: [facility]  Encounter Date: 2024   Encounter department: [facility]    Assessment & Plan   1. Depression with anxiety  Assessment & Plan:  Reports symptoms well-controlled on current treatment  No depression/anxiety; no SI/HI  Will continue sertraline 75; Wellbutrin 150 daily  Continue routine 6-month check ins  Return to care sooner with problems or concerns    ER precaution with any acute mental health change  2. Encounter for allergy testing  Assessment & Plan:  Referral to today to allergist  Patient would like challenge testing for Augmentin and possibly for environmental allergies  Orders:  -     Ambulatory Referral to Allergy; Future      Depression Screening and Follow-up Plan: Patient was screened for depression during today's encounter. They screened negative with a PHQ-9 score of 0.    Tobacco Cessation Counseling: Tobacco cessation counseling was provided. The patient is sincerely urged to quit consumption of tobacco. She is not ready to quit tobacco. Vapes  Encourage cessation      History of Present Illness     Follow up visit  Here today for mental health check-in.  Reports doing well on current sertraline and Wellbutrin combination treatment.  Denies any depression or anxiety.  No SI/HI.  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  Excited to return to school        Review of Systems   Constitutional: Negative.    Respiratory: Negative.     Cardiovascular: Negative.    Gastrointestinal: Negative.    Genitourinary: Negative.    Psychiatric/Behavioral: Negative.         Objective     /74 (BP Location: Left arm, Patient Position: Sitting, Cuff Size: Large)   Pulse 70   Temp 97.7 °F (36.5 °C) (Temporal)   Ht 5' 4.5\" (1.638 m)   Wt 78.1 kg (172 lb 3.2 oz)   SpO2 98%   BMI 29.10 kg/m²     Physical Exam  Vitals " and nursing note reviewed.   Constitutional:       General: She is not in acute distress.     Appearance: Normal appearance. She is well-developed and well-groomed. She is not ill-appearing.   HENT:      Head: Normocephalic.      Right Ear: Tympanic membrane, ear canal and external ear normal.      Left Ear: Tympanic membrane, ear canal and external ear normal.      Nose: Nose normal.      Mouth/Throat:      Mouth: Mucous membranes are moist.      Pharynx: Oropharynx is clear.   Eyes:      Conjunctiva/sclera: Conjunctivae normal.      Pupils: Pupils are equal, round, and reactive to light.   Neck:      Thyroid: No thyromegaly.      Vascular: No carotid bruit.   Cardiovascular:      Rate and Rhythm: Normal rate and regular rhythm.      Heart sounds: Normal heart sounds.   Pulmonary:      Effort: Pulmonary effort is normal. No respiratory distress.      Breath sounds: Normal breath sounds and air entry.   Lymphadenopathy:      Cervical: No cervical adenopathy.   Skin:     General: Skin is warm and dry.   Neurological:      General: No focal deficit present.      Mental Status: She is alert and oriented to person, place, and time.   Psychiatric:         Attention and Perception: Attention normal.         Mood and Affect: Mood normal.         Behavior: Behavior normal.         Thought Content: Thought content normal.         Judgment: Judgment normal.       Administrative Statements

## 2024-08-13 NOTE — ASSESSMENT & PLAN NOTE
Reports symptoms well-controlled on current treatment  No depression/anxiety; no SI/HI  Will continue sertraline 75; Wellbutrin 150 daily  Continue routine 6-month check ins  Return to care sooner with problems or concerns    ER precaution with any acute mental health change

## 2024-08-13 NOTE — ASSESSMENT & PLAN NOTE
Referral to today to allergist  Patient would like challenge testing for Augmentin and possibly for environmental allergies

## 2024-08-20 ENCOUNTER — TELEPHONE (OUTPATIENT)
Age: 24
End: 2024-08-20

## 2024-09-25 ENCOUNTER — TELEPHONE (OUTPATIENT)
Age: 24
End: 2024-09-25

## 2024-10-18 ENCOUNTER — APPOINTMENT (OUTPATIENT)
Dept: LAB | Facility: CLINIC | Age: 24
End: 2024-10-18

## 2024-10-18 DIAGNOSIS — Z00.6 ENCOUNTER FOR EXAMINATION FOR NORMAL COMPARISON OR CONTROL IN CLINICAL RESEARCH PROGRAM: ICD-10-CM

## 2024-10-18 PROCEDURE — 36415 COLL VENOUS BLD VENIPUNCTURE: CPT

## 2024-10-29 LAB
APOB+LDLR+PCSK9 GENE MUT ANL BLD/T: NOT DETECTED
BRCA1+BRCA2 DEL+DUP + FULL MUT ANL BLD/T: NOT DETECTED
MLH1+MSH2+MSH6+PMS2 GN DEL+DUP+FUL M: NOT DETECTED

## 2024-12-26 ENCOUNTER — OFFICE VISIT (OUTPATIENT)
Dept: URGENT CARE | Facility: MEDICAL CENTER | Age: 24
End: 2024-12-26
Payer: COMMERCIAL

## 2024-12-26 VITALS
TEMPERATURE: 99.3 F | HEART RATE: 98 BPM | DIASTOLIC BLOOD PRESSURE: 74 MMHG | OXYGEN SATURATION: 100 % | RESPIRATION RATE: 18 BRPM | SYSTOLIC BLOOD PRESSURE: 135 MMHG

## 2024-12-26 DIAGNOSIS — J02.9 SORE THROAT: ICD-10-CM

## 2024-12-26 DIAGNOSIS — J06.9 ACUTE URI: Primary | ICD-10-CM

## 2024-12-26 DIAGNOSIS — R05.1 ACUTE COUGH: ICD-10-CM

## 2024-12-26 LAB — S PYO AG THROAT QL: NEGATIVE

## 2024-12-26 PROCEDURE — 87880 STREP A ASSAY W/OPTIC: CPT | Performed by: PHYSICIAN ASSISTANT

## 2024-12-26 PROCEDURE — 99213 OFFICE O/P EST LOW 20 MIN: CPT | Performed by: PHYSICIAN ASSISTANT

## 2024-12-26 RX ORDER — AZITHROMYCIN 250 MG/1
TABLET, FILM COATED ORAL
Qty: 6 TABLET | Refills: 0 | Status: SHIPPED | OUTPATIENT
Start: 2024-12-26 | End: 2024-12-30

## 2024-12-26 NOTE — PATIENT INSTRUCTIONS
Patient was educated on URI and sore throat.  Patient was educated on antibiotics.  Patient was told to eat on antibiotics.  Any chest pain or shortness of breath go directly to ED.  Take over-the-counter Tylenol for any fever or pain.  Patient was told if symptoms persist follow-up PCP.  If cough persist recommend chest x-ray.

## 2024-12-26 NOTE — PROGRESS NOTES
[facility]        NAME: Luzmaria Linder is a 24 y.o. female  : 2000    MRN: [id number]  DATE: 2024  TIME: 10:38 AM    Assessment and Plan   Acute URI [J06.9]  1. Acute URI  azithromycin (ZITHROMAX) 250 mg tablet      2. Sore throat  POCT rapid ANTIGEN strepA    azithromycin (ZITHROMAX) 250 mg tablet      3. Acute cough  azithromycin (ZITHROMAX) 250 mg tablet        Rapid Strep- Negative.     Patient Instructions   Patient was educated on URI and sore throat.  Patient was educated on antibiotics.  Patient was told to eat on antibiotics.  Any chest pain or shortness of breath go directly to ED.  Take over-the-counter Tylenol for any fever or pain.  Patient was told if symptoms persist follow-up PCP.  If cough persist recommend chest x-ray.    Follow up with PCP in 3-5 days.  Proceed to  ER if symptoms worsen.    If tests have been performed at [facility], our office will contact you with results if changes need to be made to the care plan discussed with you at the visit.  [facility]    Chief Complaint     Chief Complaint   Patient presents with    Cold Like Symptoms     Patient c/o productive cough with chest pain when coughing, sore throat, and  nasal congestion x 5 days          History of Present Illness       Patient is a pleasant 24-year-old female who presents today with acute cough, sore throat, and mild congestion.  Patient reports symptoms started 5 days ago.  Per chart allergies to Augmentin however patient states that she was tested and she does not have a true allergy to Augmentin.        Review of Systems   Review of Systems   Constitutional: Negative.    HENT:  Positive for congestion and sore throat.    Respiratory:  Positive for cough.    Cardiovascular: Negative.    Psychiatric/Behavioral: Negative.           Current Medications       Current Outpatient Medications:     azithromycin (ZITHROMAX) 250 mg tablet, Take 2 tablets  today then 1 tablet daily x 4 days, Disp: 6 tablet, Rfl: 0    buPROPion (WELLBUTRIN XL) 150 mg 24 hr tablet, Take 1 tablet (150 mg total) by mouth every morning, Disp: 100 tablet, Rfl: 1    EPINEPHrine (EPIPEN) 0.3 mg/0.3 mL SOAJ, AS DIRECTED INJECTION AS NEEDED 1 DAYS (Patient not taking: Reported on 8/13/2024), Disp: , Rfl:     hydrOXYzine HCL (ATARAX) 25 mg tablet, Take 1 tablet (25 mg total) by mouth every 6 (six) hours as needed for itching, Disp: 30 tablet, Rfl: 1    Junel FE 1/20 1-20 MG-MCG per tablet, Take 1 tablet by mouth daily, Disp: , Rfl:     sertraline (ZOLOFT) 50 mg tablet, TAKE 1 AND 1/2 TABLETS BY MOUTH DAILY, Disp: 135 tablet, Rfl: 1    Current Allergies     Allergies as of 12/26/2024 - Reviewed 08/13/2024   Allergen Reaction Noted    Augmentin [amoxicillin-pot clavulanate] Hives 10/23/2023            The following portions of the patient's history were reviewed and updated as appropriate: allergies, current medications, past family history, past medical history, past social history, past surgical history and problem list.     No past medical history on file.    Past Surgical History:   Procedure Laterality Date    APPENDECTOMY      ARTHROSCOPIC REPAIR ACL         Family History   Problem Relation Age of Onset    Hypertension Father     Breast cancer Maternal Grandmother     Prostate cancer Maternal Grandfather          Medications have been verified.        Objective   /74   Pulse 98   Temp 99.3 °F (37.4 °C)   Resp 18   LMP 11/29/2024   SpO2 100%   Patient's last menstrual period was 11/29/2024.       Physical Exam     Physical Exam  Vitals and nursing note reviewed.   Constitutional:       Appearance: Normal appearance.   HENT:      Head: Normocephalic.      Comments: No pressure pain over frontal maxillary sinus.     Ears:      Comments: Bulging TMs with no signs of infection.     Mouth/Throat:      Mouth: Mucous membranes are moist.      Pharynx: Posterior oropharyngeal erythema  present.      Comments: Postnasal drip  Eyes:      Extraocular Movements: Extraocular movements intact.      Pupils: Pupils are equal, round, and reactive to light.   Cardiovascular:      Rate and Rhythm: Normal rate and regular rhythm.      Heart sounds: Normal heart sounds.   Pulmonary:      Breath sounds: Normal breath sounds. No wheezing.   Neurological:      Mental Status: She is alert and oriented to person, place, and time.   Psychiatric:         Mood and Affect: Mood normal.         Behavior: Behavior normal.

## 2025-03-03 DIAGNOSIS — F41.9 ANXIETY: ICD-10-CM

## 2025-03-10 RX ORDER — NORETHINDRONE ACETATE AND ETHINYL ESTRADIOL AND FERROUS FUMARATE 1MG-20(21)
1 KIT ORAL DAILY
Qty: 28 TABLET | Refills: 0 | OUTPATIENT
Start: 2025-03-10

## 2025-04-24 DIAGNOSIS — F41.8 DEPRESSION WITH ANXIETY: ICD-10-CM

## 2025-04-24 RX ORDER — BUPROPION HYDROCHLORIDE 150 MG/1
150 TABLET ORAL EVERY MORNING
Qty: 90 TABLET | Refills: 1 | Status: SHIPPED | OUTPATIENT
Start: 2025-04-24

## 2025-05-20 ENCOUNTER — APPOINTMENT (OUTPATIENT)
Dept: LAB | Facility: CLINIC | Age: 25
End: 2025-05-20
Payer: COMMERCIAL

## 2025-05-20 ENCOUNTER — RESULTS FOLLOW-UP (OUTPATIENT)
Dept: FAMILY MEDICINE CLINIC | Facility: CLINIC | Age: 25
End: 2025-05-20

## 2025-05-20 ENCOUNTER — OFFICE VISIT (OUTPATIENT)
Dept: FAMILY MEDICINE CLINIC | Facility: CLINIC | Age: 25
End: 2025-05-20
Payer: COMMERCIAL

## 2025-05-20 VITALS
SYSTOLIC BLOOD PRESSURE: 140 MMHG | BODY MASS INDEX: 29.49 KG/M2 | OXYGEN SATURATION: 99 % | HEART RATE: 84 BPM | HEIGHT: 65 IN | DIASTOLIC BLOOD PRESSURE: 84 MMHG | TEMPERATURE: 97.3 F | WEIGHT: 177 LBS

## 2025-05-20 DIAGNOSIS — Z13.220 SCREENING, LIPID: ICD-10-CM

## 2025-05-20 DIAGNOSIS — Z91.89 AT RISK FOR SEXUALLY TRANSMITTED DISEASE DUE TO UNPROTECTED SEX: ICD-10-CM

## 2025-05-20 DIAGNOSIS — Z13.1 SCREENING FOR DIABETES MELLITUS: ICD-10-CM

## 2025-05-20 DIAGNOSIS — Z78.9 USES BIRTH CONTROL: ICD-10-CM

## 2025-05-20 DIAGNOSIS — F41.8 DEPRESSION WITH ANXIETY: ICD-10-CM

## 2025-05-20 DIAGNOSIS — Z13.0 SCREENING, ANEMIA, DEFICIENCY, IRON: ICD-10-CM

## 2025-05-20 DIAGNOSIS — Z13.29 SCREENING FOR THYROID DISORDER: ICD-10-CM

## 2025-05-20 DIAGNOSIS — F41.9 ANXIETY: ICD-10-CM

## 2025-05-20 DIAGNOSIS — Z00.00 ANNUAL PHYSICAL EXAM: Primary | ICD-10-CM

## 2025-05-20 LAB
ALBUMIN SERPL BCG-MCNC: 4.4 G/DL (ref 3.5–5)
ALP SERPL-CCNC: 62 U/L (ref 34–104)
ALT SERPL W P-5'-P-CCNC: 35 U/L (ref 7–52)
ANION GAP SERPL CALCULATED.3IONS-SCNC: 10 MMOL/L (ref 4–13)
AST SERPL W P-5'-P-CCNC: 29 U/L (ref 13–39)
BASOPHILS # BLD AUTO: 0.04 THOUSANDS/ÂΜL (ref 0–0.1)
BASOPHILS NFR BLD AUTO: 1 % (ref 0–1)
BILIRUB SERPL-MCNC: 0.39 MG/DL (ref 0.2–1)
BUN SERPL-MCNC: 13 MG/DL (ref 5–25)
CALCIUM SERPL-MCNC: 9.2 MG/DL (ref 8.4–10.2)
CHLORIDE SERPL-SCNC: 106 MMOL/L (ref 96–108)
CHOLEST SERPL-MCNC: 218 MG/DL (ref ?–200)
CO2 SERPL-SCNC: 26 MMOL/L (ref 21–32)
CREAT SERPL-MCNC: 0.85 MG/DL (ref 0.6–1.3)
EOSINOPHIL # BLD AUTO: 0.12 THOUSAND/ÂΜL (ref 0–0.61)
EOSINOPHIL NFR BLD AUTO: 2 % (ref 0–6)
ERYTHROCYTE [DISTWIDTH] IN BLOOD BY AUTOMATED COUNT: 13.6 % (ref 11.6–15.1)
GFR SERPL CREATININE-BSD FRML MDRD: 96 ML/MIN/1.73SQ M
GLUCOSE P FAST SERPL-MCNC: 80 MG/DL (ref 65–99)
HCT VFR BLD AUTO: 39.5 % (ref 34.8–46.1)
HDLC SERPL-MCNC: 81 MG/DL
HGB BLD-MCNC: 12.7 G/DL (ref 11.5–15.4)
IMM GRANULOCYTES # BLD AUTO: 0.04 THOUSAND/UL (ref 0–0.2)
IMM GRANULOCYTES NFR BLD AUTO: 1 % (ref 0–2)
LDLC SERPL CALC-MCNC: 116 MG/DL (ref 0–100)
LYMPHOCYTES # BLD AUTO: 2.31 THOUSANDS/ÂΜL (ref 0.6–4.47)
LYMPHOCYTES NFR BLD AUTO: 30 % (ref 14–44)
MCH RBC QN AUTO: 28.2 PG (ref 26.8–34.3)
MCHC RBC AUTO-ENTMCNC: 32.2 G/DL (ref 31.4–37.4)
MCV RBC AUTO: 88 FL (ref 82–98)
MONOCYTES # BLD AUTO: 0.62 THOUSAND/ÂΜL (ref 0.17–1.22)
MONOCYTES NFR BLD AUTO: 8 % (ref 4–12)
NEUTROPHILS # BLD AUTO: 4.5 THOUSANDS/ÂΜL (ref 1.85–7.62)
NEUTS SEG NFR BLD AUTO: 58 % (ref 43–75)
NONHDLC SERPL-MCNC: 137 MG/DL
NRBC BLD AUTO-RTO: 0 /100 WBCS
PLATELET # BLD AUTO: 188 THOUSANDS/UL (ref 149–390)
PMV BLD AUTO: 12.4 FL (ref 8.9–12.7)
POTASSIUM SERPL-SCNC: 4.6 MMOL/L (ref 3.5–5.3)
PROT SERPL-MCNC: 7.4 G/DL (ref 6.4–8.4)
RBC # BLD AUTO: 4.5 MILLION/UL (ref 3.81–5.12)
SL AMB POCT URINE HCG: NEGATIVE
SODIUM SERPL-SCNC: 142 MMOL/L (ref 135–147)
TRIGL SERPL-MCNC: 103 MG/DL (ref ?–150)
TSH SERPL DL<=0.05 MIU/L-ACNC: 2.59 UIU/ML (ref 0.45–4.5)
WBC # BLD AUTO: 7.63 THOUSAND/UL (ref 4.31–10.16)

## 2025-05-20 PROCEDURE — 84443 ASSAY THYROID STIM HORMONE: CPT

## 2025-05-20 PROCEDURE — 81025 URINE PREGNANCY TEST: CPT | Performed by: NURSE PRACTITIONER

## 2025-05-20 PROCEDURE — 85025 COMPLETE CBC W/AUTO DIFF WBC: CPT

## 2025-05-20 PROCEDURE — 87591 N.GONORRHOEAE DNA AMP PROB: CPT | Performed by: NURSE PRACTITIONER

## 2025-05-20 PROCEDURE — 87389 HIV-1 AG W/HIV-1&-2 AB AG IA: CPT

## 2025-05-20 PROCEDURE — 86780 TREPONEMA PALLIDUM: CPT

## 2025-05-20 PROCEDURE — 80053 COMPREHEN METABOLIC PANEL: CPT

## 2025-05-20 PROCEDURE — 99213 OFFICE O/P EST LOW 20 MIN: CPT | Performed by: NURSE PRACTITIONER

## 2025-05-20 PROCEDURE — 80074 ACUTE HEPATITIS PANEL: CPT

## 2025-05-20 PROCEDURE — 36415 COLL VENOUS BLD VENIPUNCTURE: CPT

## 2025-05-20 PROCEDURE — 99395 PREV VISIT EST AGE 18-39: CPT | Performed by: NURSE PRACTITIONER

## 2025-05-20 PROCEDURE — 80061 LIPID PANEL: CPT

## 2025-05-20 PROCEDURE — 87491 CHLMYD TRACH DNA AMP PROBE: CPT | Performed by: NURSE PRACTITIONER

## 2025-05-20 RX ORDER — NORETHINDRONE ACETATE AND ETHINYL ESTRADIOL AND FERROUS FUMARATE 1MG-20(21)
1 KIT ORAL DAILY
Qty: 84 TABLET | Refills: 3 | Status: SHIPPED | OUTPATIENT
Start: 2025-05-20

## 2025-05-20 NOTE — PROGRESS NOTES
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  She originally wanted to discuss possibly decreasing her medication, but with above stressors, will continue current treatment at this time.  We can revisit weaning at her next 6-month checkup.   Wellbutrin 150 XL; sertraline 75 daily         Annual physical exam         Uses birth control  Follow[facility] routinely with [facility]  Recently stopped birth control about 1 month ago-for requests to restart today  She has annual scheduled for   POCT hCG in the office today negative  Rx refilled today    Orders:    POCT urine HCG    Junel FE 1/20 1-20 MG-MCG per tablet; Take 1 tablet by mouth in the morning.    At risk for sexually transmitted disease due to unprotected sex  Recent unprotected sexual encounter  Will check STD panel today  Encouraged barrier use for STD/pregnancy prevention    Orders:    Chlamydia/GC amplified DNA by PCR    RPR-Syphilis Screening (Total Syphilis IGG/IGM); Future    Hepatitis panel, acute; Future    HIV 1/2 AB/AG w Reflex [facility] for 2 yr old and above; Future    Screening, lipid    Orders:    Lipid panel; Future    Screening for thyroid disorder    Orders:    TSH, 3rd generation with Free T4 reflex; Future    Screening for diabetes mellitus    Orders:    Comprehensive metabolic panel; Future    Screening, anemia, deficiency, iron    Orders:    CBC and  differential; Future        Preventive Screenings:  - Diabetes Screening: orders placed  - Cholesterol Screening: orders placed   - Chlamydia Screening: orders placed   - Hepatitis C screening: screening up-to-date   - HIV screening: screening up-to-date   - Cervical cancer screening: screening up-to-date   - Colon cancer screening: screening not indicated   - Lung cancer screening: screening not indicated     Counseling/Anticipatory Guidance:  - Alcohol: discussed moderation in alcohol intake and recommendations for healthy alcohol use.   - Drug use: discussed harms of illicit drug use and how it can negatively impact mental/physical health.   - Tobacco use: discussed harms of tobacco use and management options for quitting.   - Dental health: discussed importance of regular tooth brushing, flossing, and dental visits.   - Sexual health: discussed sexually transmitted diseases, partner selection, use of condoms, avoidance of unintended pregnancy, and contraceptive alternatives.   - Diet: discussed recommendations for a healthy/well-balanced diet.   - Exercise: the importance of regular exercise/physical activity was discussed. Recommend exercise 3-5 times per week for at least 30 minutes.   - Injury prevention: discussed safety/seat belts, safety helmets, smoke detectors, carbon monoxide detectors, and smoking near bedding or upholstery.       Depression Screening and Follow-up Plan: Patient was screened for depression during today's encounter. They screened negative with a PHQ-9 score of 2.          History of Present Illness     Adult Annual Physical:  Patient presents for annual physical. Pleasant 24-year-old female presents today for an annual wellness exam and chronic conditions checkup  Age-appropriate preventative care/recommended screenings reviewed.     Diet and Physical Activity:  - Diet/Nutrition: well balanced diet. Encourage Mediterranean style diet  - Exercise: 3-4 times a week on  "average.    Depression Screening:    - PHQ-9 Score: 2    General Health:  - Sleep: sleeps well and 7-8 hours of sleep on average. Discussed importance of good sleep hygiene; she will be working night shift at her new job  - Hearing: normal hearing bilateral ears.  - Vision: wears glasses. at night while driving  - Dental: regular dental visits, brushes teeth twice daily and floss regularly.    /GYN Health:  - Follows with GYN: yes.   - Menopause: premenopausal.   - Last menstrual cycle: 4/23/2025.   - History of STDs: yes  - Contraception: barrier methods and oral contraceptives. [facility]; restart birth control today as above (hCG); discussed importance of condom use for STD prevention      Advanced Care Planning:  - Has an advanced directive?: no    - Has a durable medical POA?: no    - ACP document given to patient?: no      Review of Systems   Constitutional: Negative.    HENT: Negative.     Eyes: Negative.    Respiratory: Negative.     Cardiovascular: Negative.    Gastrointestinal: Negative.    Genitourinary: Negative.    Musculoskeletal: Negative.    Skin: Negative.    Neurological: Negative.    Psychiatric/Behavioral: Negative.           Objective   /84   Pulse 84   Temp (!) 97.3 °F (36.3 °C)   Ht 5' 5\" (1.651 m)   Wt 80.3 kg (177 lb)   LMP 04/23/2025   SpO2 99%   BMI 29.45 kg/m²   BP normally within range.  Check periodic home Bps    Physical Exam  Vitals and nursing note reviewed.   Constitutional:       General: She is not in acute distress.     Appearance: Normal appearance. She is well-developed and well-groomed. She is not ill-appearing.   HENT:      Head: Normocephalic.      Right Ear: Tympanic membrane, ear canal and external ear normal.      Left Ear: Tympanic membrane, ear canal and external ear normal.      Nose: Nose normal.      Mouth/Throat:      Mouth: Mucous membranes are moist.      Pharynx: Oropharynx is clear.     Eyes:      " Conjunctiva/sclera: Conjunctivae normal.      Pupils: Pupils are equal, round, and reactive to light.     Neck:      Thyroid: No thyromegaly.      Vascular: No carotid bruit.     Cardiovascular:      Rate and Rhythm: Normal rate and regular rhythm.      Pulses:           Posterior tibial pulses are 2+ on the right side and 2+ on the left side.      Heart sounds: Normal heart sounds.   Pulmonary:      Effort: Pulmonary effort is normal. No respiratory distress.      Breath sounds: Normal breath sounds and air entry.     Musculoskeletal:      Right lower leg: No edema.      Left lower leg: No edema.   Lymphadenopathy:      Cervical: No cervical adenopathy.     Skin:     General: Skin is warm and dry.     Neurological:      General: No focal deficit present.      Mental Status: She is alert and oriented to person, place, and time.     Psychiatric:         Attention and Perception: Attention normal.         Mood and Affect: Mood normal.         Behavior: Behavior normal.         Thought Content: Thought content normal.         Judgment: Judgment normal.

## 2025-05-20 NOTE — ASSESSMENT & PLAN NOTE
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  We can revisit weaning at her next 6-month checkup.   Wellbutrin 150 XL; sertraline 75 daily

## 2025-05-21 LAB
C TRACH DNA SPEC QL NAA+PROBE: NEGATIVE
HAV IGM SER QL: NORMAL
HBV CORE IGM SER QL: NORMAL
HBV SURFACE AG SER QL: NORMAL
HCV AB SER QL: NORMAL
HIV 1+2 AB+HIV1 P24 AG SERPL QL IA: NORMAL
N GONORRHOEA DNA SPEC QL NAA+PROBE: NEGATIVE
TREPONEMA PALLIDUM IGG+IGM AB [PRESENCE] IN SERUM OR PLASMA BY IMMUNOASSAY: NORMAL

## 2025-05-29 ENCOUNTER — PATIENT MESSAGE (OUTPATIENT)
Dept: FAMILY MEDICINE CLINIC | Facility: CLINIC | Age: 25
End: 2025-05-29

## 2025-05-29 NOTE — PATIENT COMMUNICATION
Patient returned call to schedule. Due to availability I warm transferred patient to office clerical for further assistance. Warm transfer successful.

## 2025-06-02 ENCOUNTER — RESULTS FOLLOW-UP (OUTPATIENT)
Dept: FAMILY MEDICINE CLINIC | Facility: CLINIC | Age: 25
End: 2025-06-02

## 2025-06-02 ENCOUNTER — APPOINTMENT (OUTPATIENT)
Dept: LAB | Facility: CLINIC | Age: 25
End: 2025-06-02
Payer: COMMERCIAL

## 2025-06-02 ENCOUNTER — OFFICE VISIT (OUTPATIENT)
Dept: FAMILY MEDICINE CLINIC | Facility: CLINIC | Age: 25
End: 2025-06-02
Payer: COMMERCIAL

## 2025-06-02 VITALS
HEIGHT: 66 IN | OXYGEN SATURATION: 98 % | SYSTOLIC BLOOD PRESSURE: 100 MMHG | BODY MASS INDEX: 28.7 KG/M2 | HEART RATE: 75 BPM | TEMPERATURE: 98.3 F | DIASTOLIC BLOOD PRESSURE: 80 MMHG | WEIGHT: 178.6 LBS

## 2025-06-02 DIAGNOSIS — R23.3 EASY BRUISING: Primary | ICD-10-CM

## 2025-06-02 DIAGNOSIS — E53.8 VITAMIN B12 DEFICIENCY: ICD-10-CM

## 2025-06-02 DIAGNOSIS — E61.1 IRON DEFICIENCY: Primary | ICD-10-CM

## 2025-06-02 DIAGNOSIS — R23.3 EASY BRUISING: ICD-10-CM

## 2025-06-02 LAB
APTT PPP: 30 SECONDS (ref 23–34)
FERRITIN SERPL-MCNC: 8 NG/ML (ref 30–307)
INR PPP: 0.9 (ref 0.85–1.19)
IRON SATN MFR SERPL: 6 % (ref 15–50)
IRON SERPL-MCNC: 30 UG/DL (ref 50–212)
PROTHROMBIN TIME: 12.5 SECONDS (ref 12.3–15)
TIBC SERPL-MCNC: 492.8 UG/DL (ref 250–450)
TRANSFERRIN SERPL-MCNC: 352 MG/DL (ref 203–362)
UIBC SERPL-MCNC: 463 UG/DL (ref 155–355)
VIT B12 SERPL-MCNC: 258 PG/ML (ref 180–914)

## 2025-06-02 PROCEDURE — 85610 PROTHROMBIN TIME: CPT

## 2025-06-02 PROCEDURE — 83540 ASSAY OF IRON: CPT

## 2025-06-02 PROCEDURE — 99213 OFFICE O/P EST LOW 20 MIN: CPT

## 2025-06-02 PROCEDURE — 82728 ASSAY OF FERRITIN: CPT

## 2025-06-02 PROCEDURE — 36415 COLL VENOUS BLD VENIPUNCTURE: CPT

## 2025-06-02 PROCEDURE — 82607 VITAMIN B-12: CPT

## 2025-06-02 PROCEDURE — 83550 IRON BINDING TEST: CPT

## 2025-06-02 PROCEDURE — 85730 THROMBOPLASTIN TIME PARTIAL: CPT

## 2025-06-02 NOTE — PROGRESS NOTES
Name: Luzmaria Linder      : 2000      MRN: [id number]  Encounter Provider: Carlie Gastelum PA-C  Encounter Date: 2025   Encounter department: [facility]  :  Assessment & Plan  Easy bruising  Patient presents today for evaluation of easy bruising despite no trauma or cause of bruising.  Bruising present across bilateral thighs, was able to be evaluated on today's exam.  No particular increased pain with palpation of these areas.  She denies any domestic violence or issues with interpersonal relationships.  Recommend checking iron panel as she may be iron deficient without anemia.  Her CBC was completed about 2 weeks ago which was within normal limits.  Patient may be slightly iron deficient and benefit from an iron supplement.  Will also check PT/INR/APTT to evaluate for any bleeding or clotting disorders.  She denies any family history of bleeding and clotting disorders.  Will be in touch with lab results and next recommendations.  Patient very agreeable with plan, grateful for workup and will follow-up as needed with PCP.  Orders:  •  Iron Panel (Includes Ferritin, Iron Sat%, Iron, and TIBC); Future  •  Vitamin B12; Future  •  Protime-INR; Future  •  APTT; Future    Vitamin B12 deficiency  History of mildly low B12, may be a component of easy bruising, will recheck levels today.  Orders:  •  Vitamin B12; Future           History of Present Illness   Patient presents today for evaluation of bruising on bilateral upper thighs which has been present for about a week.  She reports this developing out of nowhere and has been unable to find a cause of bruising.  She has had no trauma to the upper thighs.  No recent new pets.  She denies any domestic violence or violence in general.  Reports good relationships with family and friends.  Reports possible history of iron deficiency in the past as she has been told while donating blood that her iron was too low to donate.  She  "reports increased fatigue for the past week.  Had menstrual cycle last week.  Reports medium flow.  Denies any changes in medications.  Compliant with Zoloft and Wellbutrin as prescribed.  Compliant with Junel.  No other new symptoms.      Review of Systems   Constitutional:  Positive for fatigue. Negative for chills, fever and unexpected weight change.   Respiratory:  Negative for cough, chest tightness and shortness of breath.    Cardiovascular:  Negative for chest pain, palpitations and leg swelling.   Neurological:  Negative for dizziness, light-headedness and headaches.   Hematological:  Bruises/bleeds easily.       Objective   /80 (BP Location: Left arm, Patient Position: Sitting, Cuff Size: Adult)   Pulse 75   Temp 98.3 °F (36.8 °C) (Temporal)   Ht 5' 5.5\" (1.664 m)   Wt 81 kg (178 lb 9.6 oz)   LMP 05/19/2025 (Approximate)   SpO2 98%   BMI 29.27 kg/m²      Physical Exam  Vitals and nursing note reviewed.   Constitutional:       General: She is not in acute distress.     Appearance: Normal appearance. She is not ill-appearing.   HENT:      Head: Normocephalic and atraumatic.   Pulmonary:      Effort: Pulmonary effort is normal. No respiratory distress.     Skin:     Coloration: Skin is not cyanotic or pale.           Comments: Bruising present through bilateral upper thighs, most prominent bruise present on left inner thigh.  Minimal pain with palpation.  No trauma to these areas.  Denies any harm being done to her.     Neurological:      General: No focal deficit present.      Mental Status: She is alert and oriented to person, place, and time.     Psychiatric:         Mood and Affect: Mood normal.         Behavior: Behavior normal.         Judgment: Judgment normal.         "

## 2025-06-03 RX ORDER — FERROUS SULFATE 324(65)MG
324 TABLET, DELAYED RELEASE (ENTERIC COATED) ORAL
Qty: 60 TABLET | Refills: 1 | Status: SHIPPED | OUTPATIENT
Start: 2025-06-03

## 2025-06-04 ENCOUNTER — APPOINTMENT (OUTPATIENT)
Dept: URGENT CARE | Facility: CLINIC | Age: 25
End: 2025-06-04

## 2025-06-04 ENCOUNTER — APPOINTMENT (OUTPATIENT)
Dept: LAB | Facility: CLINIC | Age: 25
End: 2025-06-04
Attending: PHYSICIAN ASSISTANT

## 2025-06-04 DIAGNOSIS — Z02.1 ENCOUNTER FOR PRE-EMPLOYMENT EXAMINATION: ICD-10-CM

## 2025-06-04 DIAGNOSIS — Z02.1 ENCOUNTER FOR PRE-EMPLOYMENT EXAMINATION: Primary | ICD-10-CM

## 2025-06-04 LAB — RUBV IGG SERPL IA-ACNC: 69.9 IU/ML

## 2025-06-04 PROCEDURE — 86787 VARICELLA-ZOSTER ANTIBODY: CPT

## 2025-06-04 PROCEDURE — 36415 COLL VENOUS BLD VENIPUNCTURE: CPT

## 2025-06-04 PROCEDURE — 86762 RUBELLA ANTIBODY: CPT

## 2025-06-04 PROCEDURE — 86735 MUMPS ANTIBODY: CPT

## 2025-06-04 PROCEDURE — 86765 RUBEOLA ANTIBODY: CPT

## 2025-06-04 PROCEDURE — 86480 TB TEST CELL IMMUN MEASURE: CPT

## 2025-06-05 LAB
GAMMA INTERFERON BACKGROUND BLD IA-ACNC: 0.04 IU/ML
M TB IFN-G BLD-IMP: NEGATIVE
M TB IFN-G CD4+ BCKGRND COR BLD-ACNC: 0.02 IU/ML
M TB IFN-G CD4+ BCKGRND COR BLD-ACNC: 0.05 IU/ML
MEV IGG SER QL IA: >300 AU/ML
MEV IGG SER QL IA: POSITIVE
MITOGEN IGNF BCKGRD COR BLD-ACNC: 9.96 IU/ML
MUV IGG SER QL IA: 279 AU/ML
MUV IGG SER QL IA: POSITIVE
VZV IGG SER QL IA: 8.97 S/CO
VZV IGG SER QL IA: POSITIVE

## 2025-06-06 ENCOUNTER — TELEPHONE (OUTPATIENT)
Dept: FAMILY MEDICINE CLINIC | Facility: CLINIC | Age: 25
End: 2025-06-06

## 2025-06-06 NOTE — TELEPHONE ENCOUNTER
Pt came to check in and dropped off pre- employment physical form. Pt is requesting for Jessica to complete form. Form placed in Jessica's folder on desk. Please scan to chart and fax form to [phone] once complete.

## 2025-06-09 NOTE — TELEPHONE ENCOUNTER
Patient called the office regarding her pre-employment paperwork. Patient states this paperwork is time sensitive and she  would need this by Wednesday if possible. Please review.

## 2025-07-23 DIAGNOSIS — Z78.9 USES BIRTH CONTROL: ICD-10-CM

## 2025-07-23 DIAGNOSIS — F41.8 DEPRESSION WITH ANXIETY: ICD-10-CM

## 2025-07-23 DIAGNOSIS — F41.9 ANXIETY: ICD-10-CM

## 2025-07-24 RX ORDER — BUPROPION HYDROCHLORIDE 150 MG/1
150 TABLET ORAL EVERY MORNING
Qty: 90 TABLET | Refills: 1 | Status: SHIPPED | OUTPATIENT
Start: 2025-07-24

## 2025-07-24 RX ORDER — NORETHINDRONE ACETATE AND ETHINYL ESTRADIOL AND FERROUS FUMARATE 1MG-20(21)
1 KIT ORAL DAILY
Qty: 84 TABLET | Refills: 1 | Status: SHIPPED | OUTPATIENT
Start: 2025-07-24

## 2025-08-08 ENCOUNTER — TELEPHONE (OUTPATIENT)
Age: 25
End: 2025-08-08